# Patient Record
Sex: MALE | Race: WHITE | NOT HISPANIC OR LATINO | Employment: OTHER | URBAN - METROPOLITAN AREA
[De-identification: names, ages, dates, MRNs, and addresses within clinical notes are randomized per-mention and may not be internally consistent; named-entity substitution may affect disease eponyms.]

---

## 2023-04-05 ENCOUNTER — HOSPITAL ENCOUNTER (OUTPATIENT)
Dept: DATA CONVERSION | Facility: HOSPITAL | Age: 73
End: 2023-04-05
Attending: RADIOLOGY | Admitting: RADIOLOGY
Payer: MEDICARE

## 2023-04-05 DIAGNOSIS — R18.8 OTHER ASCITES: ICD-10-CM

## 2023-04-05 DIAGNOSIS — Z98.84 BARIATRIC SURGERY STATUS: ICD-10-CM

## 2023-04-13 PROBLEM — K21.9 GASTROESOPHAGEAL REFLUX DISEASE: Status: ACTIVE | Noted: 2023-04-13

## 2023-05-10 LAB
INR IN PPP BY COAGULATION ASSAY: 1 (ref 0.9–1.1)
PROTHROMBIN TIME (PT) IN PPP BY COAGULATION ASSAY: 11.9 SEC (ref 9.8–13.4)

## 2023-05-11 LAB
ALANINE AMINOTRANSFERASE (SGPT) (U/L) IN SER/PLAS: 10 U/L (ref 10–52)
ALBUMIN (G/DL) IN SER/PLAS: 3.7 G/DL (ref 3.4–5)
ALKALINE PHOSPHATASE (U/L) IN SER/PLAS: 73 U/L (ref 33–136)
ALPHA-1 FETOPROTEIN (NG/ML) IN SER/PLAS: <4 NG/ML (ref 0–9)
ANION GAP IN SER/PLAS: 11 MMOL/L (ref 10–20)
ASPARTATE AMINOTRANSFERASE (SGOT) (U/L) IN SER/PLAS: 18 U/L (ref 9–39)
BASOPHILS (10*3/UL) IN BLOOD BY AUTOMATED COUNT: 0.05 X10E9/L (ref 0–0.1)
BASOPHILS/100 LEUKOCYTES IN BLOOD BY AUTOMATED COUNT: 1 % (ref 0–2)
BILIRUBIN DIRECT (MG/DL) IN SER/PLAS: 0.2 MG/DL (ref 0–0.3)
BILIRUBIN TOTAL (MG/DL) IN SER/PLAS: 0.6 MG/DL (ref 0–1.2)
CALCIUM (MG/DL) IN SER/PLAS: 9.1 MG/DL (ref 8.6–10.6)
CARBON DIOXIDE, TOTAL (MMOL/L) IN SER/PLAS: 28 MMOL/L (ref 21–32)
CHLORIDE (MMOL/L) IN SER/PLAS: 104 MMOL/L (ref 98–107)
CREATININE (MG/DL) IN SER/PLAS: 0.75 MG/DL (ref 0.5–1.3)
EOSINOPHILS (10*3/UL) IN BLOOD BY AUTOMATED COUNT: 0.09 X10E9/L (ref 0–0.4)
EOSINOPHILS/100 LEUKOCYTES IN BLOOD BY AUTOMATED COUNT: 1.8 % (ref 0–6)
ERYTHROCYTE DISTRIBUTION WIDTH (RATIO) BY AUTOMATED COUNT: 13.8 % (ref 11.5–14.5)
ERYTHROCYTE MEAN CORPUSCULAR HEMOGLOBIN CONCENTRATION (G/DL) BY AUTOMATED: 31.4 G/DL (ref 32–36)
ERYTHROCYTE MEAN CORPUSCULAR VOLUME (FL) BY AUTOMATED COUNT: 98 FL (ref 80–100)
ERYTHROCYTES (10*6/UL) IN BLOOD BY AUTOMATED COUNT: 4.41 X10E12/L (ref 4.5–5.9)
GFR MALE: >90 ML/MIN/1.73M2
GLUCOSE (MG/DL) IN SER/PLAS: 77 MG/DL (ref 74–99)
HEMATOCRIT (%) IN BLOOD BY AUTOMATED COUNT: 43 % (ref 41–52)
HEMOGLOBIN (G/DL) IN BLOOD: 13.5 G/DL (ref 13.5–17.5)
IMMATURE GRANULOCYTES/100 LEUKOCYTES IN BLOOD BY AUTOMATED COUNT: 0.2 % (ref 0–0.9)
LEUKOCYTES (10*3/UL) IN BLOOD BY AUTOMATED COUNT: 5.1 X10E9/L (ref 4.4–11.3)
LYMPHOCYTES (10*3/UL) IN BLOOD BY AUTOMATED COUNT: 1.29 X10E9/L (ref 0.8–3)
LYMPHOCYTES/100 LEUKOCYTES IN BLOOD BY AUTOMATED COUNT: 25.1 % (ref 13–44)
MONOCYTES (10*3/UL) IN BLOOD BY AUTOMATED COUNT: 0.37 X10E9/L (ref 0.05–0.8)
MONOCYTES/100 LEUKOCYTES IN BLOOD BY AUTOMATED COUNT: 7.2 % (ref 2–10)
NEUTROPHILS (10*3/UL) IN BLOOD BY AUTOMATED COUNT: 3.33 X10E9/L (ref 1.6–5.5)
NEUTROPHILS/100 LEUKOCYTES IN BLOOD BY AUTOMATED COUNT: 64.7 % (ref 40–80)
NRBC (PER 100 WBCS) BY AUTOMATED COUNT: 0 /100 WBC (ref 0–0)
PLATELETS (10*3/UL) IN BLOOD AUTOMATED COUNT: 256 X10E9/L (ref 150–450)
POTASSIUM (MMOL/L) IN SER/PLAS: 4.1 MMOL/L (ref 3.5–5.3)
PROTEIN TOTAL: 6.8 G/DL (ref 6.4–8.2)
SODIUM (MMOL/L) IN SER/PLAS: 139 MMOL/L (ref 136–145)
UREA NITROGEN (MG/DL) IN SER/PLAS: 16 MG/DL (ref 6–23)

## 2023-06-12 PROBLEM — K44.9 HERNIA, HIATAL: Status: ACTIVE | Noted: 2023-06-12

## 2023-06-12 PROBLEM — K28.9 ULCER, ANASTOMOTIC: Status: ACTIVE | Noted: 2023-06-12

## 2023-06-12 PROBLEM — I10 HYPERTENSION, UNCONTROLLED: Status: ACTIVE | Noted: 2023-06-12

## 2023-06-12 PROBLEM — R22.2 NODULE OF SKIN OF CHEST: Status: ACTIVE | Noted: 2023-06-12

## 2023-06-12 PROBLEM — K56.609 SMALL BOWEL OBSTRUCTION (MULTI): Status: ACTIVE | Noted: 2023-06-12

## 2023-06-12 PROBLEM — G47.33 OBSTRUCTIVE SLEEP APNEA: Status: ACTIVE | Noted: 2023-06-12

## 2023-06-12 PROBLEM — L97.909 VENOUS STASIS ULCER (MULTI): Status: ACTIVE | Noted: 2023-06-12

## 2023-06-12 PROBLEM — R18.8 OTHER ASCITES: Status: ACTIVE | Noted: 2023-06-12

## 2023-06-12 PROBLEM — I27.20 PULMONARY HYPERTENSION (MULTI): Status: ACTIVE | Noted: 2023-06-12

## 2023-06-12 PROBLEM — E55.9 VITAMIN D DEFICIENCY: Status: ACTIVE | Noted: 2023-06-12

## 2023-06-12 PROBLEM — I10 BENIGN ESSENTIAL HYPERTENSION: Status: ACTIVE | Noted: 2023-06-12

## 2023-06-12 PROBLEM — K76.6 PORTAL HYPERTENSION (MULTI): Status: ACTIVE | Noted: 2023-06-12

## 2023-06-12 PROBLEM — I83.009 VENOUS STASIS ULCER (MULTI): Status: ACTIVE | Noted: 2023-06-12

## 2023-06-12 PROBLEM — I82.511 CHRONIC DEEP VEIN THROMBOSIS (DVT) OF FEMORAL VEIN OF RIGHT LOWER EXTREMITY (MULTI): Status: ACTIVE | Noted: 2023-06-12

## 2023-06-12 PROBLEM — K27.9 PUD (PEPTIC ULCER DISEASE): Status: ACTIVE | Noted: 2023-06-12

## 2023-06-12 PROBLEM — B99.9 INTRA-ABDOMINAL INFECTION: Status: ACTIVE | Noted: 2023-06-12

## 2023-06-12 PROBLEM — E78.2 ELEVATED TRIGLYCERIDES WITH HIGH CHOLESTEROL: Status: ACTIVE | Noted: 2023-06-12

## 2023-06-12 PROBLEM — Z98.84 HISTORY OF BARIATRIC SURGERY: Status: ACTIVE | Noted: 2023-06-12

## 2023-06-12 PROBLEM — K74.60 HEPATIC CIRRHOSIS (MULTI): Status: ACTIVE | Noted: 2023-06-12

## 2023-06-12 PROBLEM — E61.1 LOW SERUM IRON: Status: ACTIVE | Noted: 2023-06-12

## 2023-06-12 PROBLEM — E53.8 VITAMIN B12 DEFICIENCY: Status: ACTIVE | Noted: 2023-06-12

## 2023-06-12 PROBLEM — I95.1 ORTHOSTATIC HYPOTENSION: Status: ACTIVE | Noted: 2023-06-12

## 2023-06-12 PROBLEM — R20.9 SENSORY DISTURBANCE: Status: ACTIVE | Noted: 2023-06-12

## 2023-06-12 PROBLEM — N20.0 NEPHROLITHIASIS: Status: ACTIVE | Noted: 2023-06-12

## 2023-06-12 PROBLEM — Z95.828 S/P INSERTION OF INFERIOR VENA CAVAL FILTER: Status: ACTIVE | Noted: 2023-06-12

## 2023-06-12 PROBLEM — N52.9 INABILITY TO ATTAIN ERECTION: Status: ACTIVE | Noted: 2023-06-12

## 2023-06-12 PROBLEM — I89.0 LYMPHEDEMA: Status: ACTIVE | Noted: 2023-06-12

## 2023-06-12 PROBLEM — I49.5 TACHY-BRADY SYNDROME (MULTI): Status: ACTIVE | Noted: 2023-06-12

## 2023-06-12 PROBLEM — H60.543 ECZEMA OF BOTH EXTERNAL EARS: Status: ACTIVE | Noted: 2023-06-12

## 2023-06-12 PROBLEM — L57.0 ACTINIC KERATOSIS: Status: ACTIVE | Noted: 2023-06-12

## 2023-06-12 PROBLEM — E83.119 HEMOCHROMATOSIS: Status: ACTIVE | Noted: 2023-06-12

## 2023-06-12 PROBLEM — K92.2 GASTROINTESTINAL BLEED: Status: ACTIVE | Noted: 2023-06-12

## 2023-06-12 PROBLEM — R32 URINARY INCONTINENCE: Status: ACTIVE | Noted: 2023-06-12

## 2023-06-12 PROBLEM — B35.6 DERMATOPHYTOSIS OF GROIN AND PERIANAL AREA: Status: ACTIVE | Noted: 2023-06-12

## 2023-06-12 PROBLEM — G93.32 CHRONIC FATIGUE SYNDROME: Status: ACTIVE | Noted: 2023-06-12

## 2023-06-12 RX ORDER — BUPROPION HYDROCHLORIDE 75 MG/1
1 TABLET ORAL DAILY
COMMUNITY
Start: 2023-03-02 | End: 2023-09-13 | Stop reason: ALTCHOICE

## 2023-06-12 RX ORDER — SPIRONOLACTONE 25 MG/1
3 TABLET ORAL DAILY
COMMUNITY
Start: 2022-11-11 | End: 2023-10-03 | Stop reason: SDUPTHER

## 2023-06-12 RX ORDER — ACETAMINOPHEN, DIPHENHYDRAMINE HCL, PHENYLEPHRINE HCL 325; 25; 5 MG/1; MG/1; MG/1
TABLET ORAL
COMMUNITY
Start: 2021-02-24

## 2023-06-12 RX ORDER — MULTIVITAMIN
1 TABLET,CHEWABLE ORAL DAILY
COMMUNITY

## 2023-06-15 ENCOUNTER — OFFICE VISIT (OUTPATIENT)
Dept: PRIMARY CARE | Facility: CLINIC | Age: 73
End: 2023-06-15
Payer: MEDICARE

## 2023-06-15 VITALS
SYSTOLIC BLOOD PRESSURE: 142 MMHG | WEIGHT: 183.2 LBS | OXYGEN SATURATION: 100 % | DIASTOLIC BLOOD PRESSURE: 70 MMHG | HEART RATE: 57 BPM | TEMPERATURE: 96.2 F | BODY MASS INDEX: 28.69 KG/M2

## 2023-06-15 DIAGNOSIS — L03.114 CELLULITIS OF LEFT FOREARM: Primary | ICD-10-CM

## 2023-06-15 DIAGNOSIS — S83.92XA SPRAIN OF LEFT KNEE, UNSPECIFIED LIGAMENT, INITIAL ENCOUNTER: ICD-10-CM

## 2023-06-15 DIAGNOSIS — I49.5 TACHY-BRADY SYNDROME (MULTI): ICD-10-CM

## 2023-06-15 DIAGNOSIS — K74.60 CIRRHOSIS OF LIVER WITH ASCITES, UNSPECIFIED HEPATIC CIRRHOSIS TYPE (MULTI): ICD-10-CM

## 2023-06-15 DIAGNOSIS — I27.20 PULMONARY HYPERTENSION (MULTI): ICD-10-CM

## 2023-06-15 DIAGNOSIS — R18.8 CIRRHOSIS OF LIVER WITH ASCITES, UNSPECIFIED HEPATIC CIRRHOSIS TYPE (MULTI): ICD-10-CM

## 2023-06-15 DIAGNOSIS — I10 BENIGN ESSENTIAL HYPERTENSION: ICD-10-CM

## 2023-06-15 DIAGNOSIS — Z95.828 S/P INSERTION OF INFERIOR VENA CAVAL FILTER: ICD-10-CM

## 2023-06-15 DIAGNOSIS — K76.6 PORTAL HYPERTENSION (MULTI): ICD-10-CM

## 2023-06-15 DIAGNOSIS — R32 URINARY INCONTINENCE, UNSPECIFIED TYPE: ICD-10-CM

## 2023-06-15 PROBLEM — L57.0 ACTINIC KERATOSIS: Status: RESOLVED | Noted: 2023-06-12 | Resolved: 2023-06-15

## 2023-06-15 PROBLEM — L97.909 VENOUS STASIS ULCER (MULTI): Status: RESOLVED | Noted: 2023-06-12 | Resolved: 2023-06-15

## 2023-06-15 PROBLEM — G93.32 CHRONIC FATIGUE SYNDROME: Status: RESOLVED | Noted: 2023-06-12 | Resolved: 2023-06-15

## 2023-06-15 PROBLEM — I83.009 VENOUS STASIS ULCER (MULTI): Status: RESOLVED | Noted: 2023-06-12 | Resolved: 2023-06-15

## 2023-06-15 PROBLEM — K44.9 HERNIA, HIATAL: Status: RESOLVED | Noted: 2023-06-12 | Resolved: 2023-06-15

## 2023-06-15 PROCEDURE — 3077F SYST BP >= 140 MM HG: CPT | Performed by: INTERNAL MEDICINE

## 2023-06-15 PROCEDURE — 1036F TOBACCO NON-USER: CPT | Performed by: INTERNAL MEDICINE

## 2023-06-15 PROCEDURE — 99214 OFFICE O/P EST MOD 30 MIN: CPT | Performed by: INTERNAL MEDICINE

## 2023-06-15 PROCEDURE — 3078F DIAST BP <80 MM HG: CPT | Performed by: INTERNAL MEDICINE

## 2023-06-15 PROCEDURE — 1159F MED LIST DOCD IN RCRD: CPT | Performed by: INTERNAL MEDICINE

## 2023-06-15 PROCEDURE — 1160F RVW MEDS BY RX/DR IN RCRD: CPT | Performed by: INTERNAL MEDICINE

## 2023-06-15 ASSESSMENT — ENCOUNTER SYMPTOMS
WHEEZING: 0
ABDOMINAL PAIN: 0
FATIGUE: 0
ARTHRALGIAS: 0
BLOOD IN STOOL: 0
WOUND: 1
DIARRHEA: 0
BRUISES/BLEEDS EASILY: 0
FEVER: 0
DIZZINESS: 0
JOINT SWELLING: 1
PALPITATIONS: 0
SINUS PAIN: 0
DIFFICULTY URINATING: 0
HEADACHES: 0
SORE THROAT: 0
COUGH: 0
UNEXPECTED WEIGHT CHANGE: 0

## 2023-06-15 NOTE — PROGRESS NOTES
Subjective   Patient ID: William Philip is a 72 y.o. male who presents for Fall (Lt knee hyperextended, lt wrist wound x 1 week).    -Patient scratched his left forearm comes today for evaluation left forearm cellulitis with large scab local cellulitis  Patient given clean dressing in the office with triple antibiotic and nonstick dressing patient need to change her dressing every 72 hours until following up this scab follow-up if no improvement  - Patient fell over his dog in his backyard now having left knee pain physical exam left knee sprain patient counseled about ice packing knee support follow-up closely as needed  - Patient seen by hematology Dr.:Hubert reason for consult reviewed and scanned in the chart  Liver cirrhosis stage III, 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       Fall  Pertinent negatives include no abdominal pain, fever or headaches.          Review of Systems   Constitutional:  Negative for fatigue, fever and unexpected weight change.   HENT:  Negative for congestion, ear discharge, ear pain, mouth sores, sinus pain and sore throat.    Eyes:  Negative for visual disturbance.   Respiratory:  Negative for cough and wheezing.    Cardiovascular:  Negative for chest pain, palpitations and leg swelling.   Gastrointestinal:  Negative for abdominal pain, blood in stool and diarrhea.   Genitourinary:  Negative for difficulty  urinating.   Musculoskeletal:  Positive for joint swelling (Left knee swelling). Negative for arthralgias.   Skin:  Positive for wound (arm wound left forearm wound). Negative for rash.   Neurological:  Negative for dizziness and headaches.   Hematological:  Does not bruise/bleed easily.   Psychiatric/Behavioral:  Negative for behavioral problems.    All other systems reviewed and are negative.      Objective   Lab Results   Component Value Date    HGBA1C 4.6 05/14/2021      /70   Pulse 57   Temp 35.7 °C (96.2 °F)   Wt 83.1 kg (183 lb 3.2 oz)   SpO2 100%   BMI 28.69 kg/m²     Physical Exam  Vitals and nursing note reviewed.   Constitutional:       Appearance: Normal appearance. He is ill-appearing.   HENT:      Head: Normocephalic.      Nose: Nose normal.   Eyes:      Conjunctiva/sclera: Conjunctivae normal.      Pupils: Pupils are equal, round, and reactive to light.   Cardiovascular:      Rate and Rhythm: Regular rhythm.   Pulmonary:      Effort: Pulmonary effort is normal.      Breath sounds: Normal breath sounds.   Abdominal:      General: Abdomen is flat.      Palpations: Abdomen is soft.   Musculoskeletal:         General: Swelling and tenderness (Left knee swelling and tenderness) present.      Cervical back: Neck supple.   Skin:     General: Skin is warm.      Findings: Erythema (Left forearm cellulitis and scab) present.   Neurological:      General: No focal deficit present.      Mental Status: He is oriented to person, place, and time.   Psychiatric:         Mood and Affect: Mood normal.         Assessment/Plan   William was seen today for fall.  Diagnoses and all orders for this visit:  Cellulitis of left forearm (Primary)  Portal hypertension (CMS/HCC)  Pulmonary hypertension (CMS/HCC)  Tachy-hernan syndrome (CMS/HCC)  Sprain of left knee, unspecified ligament, initial encounter  Benign essential hypertension  Urinary incontinence, unspecified type  S/P insertion of inferior vena caval  filter  Cirrhosis of liver with ascites, unspecified hepatic cirrhosis type (CMS/HCC)   -Patient scratched his left forearm comes today for evaluation left forearm cellulitis with large scab local cellulitis  Patient given clean dressing in the office with triple antibiotic and nonstick dressing patient need to change her dressing every 72 hours until following up this scab follow-up if no improvement  - Patient fell over his dog in his backyard now having left knee pain physical exam left knee sprain patient counseled about ice packing knee support follow-up closely as needed  - Patient seen by hematology Dr.:Hubert reason for consult reviewed and scanned in the chart  Liver cirrhosis stage III, 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

## 2023-07-06 ENCOUNTER — HOSPITAL ENCOUNTER (OUTPATIENT)
Dept: DATA CONVERSION | Facility: HOSPITAL | Age: 73
End: 2023-07-06
Attending: RADIOLOGY | Admitting: RADIOLOGY
Payer: MEDICARE

## 2023-07-06 DIAGNOSIS — R18.8 OTHER ASCITES: ICD-10-CM

## 2023-07-10 LAB
ALANINE AMINOTRANSFERASE (SGPT) (U/L) IN SER/PLAS: 11 U/L (ref 10–52)
ALBUMIN (G/DL) IN SER/PLAS: 3.7 G/DL (ref 3.4–5)
ALKALINE PHOSPHATASE (U/L) IN SER/PLAS: 77 U/L (ref 33–136)
ANION GAP IN SER/PLAS: 11 MMOL/L (ref 10–20)
ASPARTATE AMINOTRANSFERASE (SGOT) (U/L) IN SER/PLAS: 18 U/L (ref 9–39)
BASOPHILS (10*3/UL) IN BLOOD BY AUTOMATED COUNT: 0.08 X10E9/L (ref 0–0.1)
BASOPHILS/100 LEUKOCYTES IN BLOOD BY AUTOMATED COUNT: 1.5 % (ref 0–2)
BILIRUBIN DIRECT (MG/DL) IN SER/PLAS: 0.1 MG/DL (ref 0–0.3)
BILIRUBIN TOTAL (MG/DL) IN SER/PLAS: 0.4 MG/DL (ref 0–1.2)
CALCIUM (MG/DL) IN SER/PLAS: 9 MG/DL (ref 8.6–10.3)
CARBON DIOXIDE, TOTAL (MMOL/L) IN SER/PLAS: 28 MMOL/L (ref 21–32)
CHLORIDE (MMOL/L) IN SER/PLAS: 103 MMOL/L (ref 98–107)
CREATININE (MG/DL) IN SER/PLAS: 0.84 MG/DL (ref 0.5–1.3)
EOSINOPHILS (10*3/UL) IN BLOOD BY AUTOMATED COUNT: 0.11 X10E9/L (ref 0–0.4)
EOSINOPHILS/100 LEUKOCYTES IN BLOOD BY AUTOMATED COUNT: 2 % (ref 0–6)
ERYTHROCYTE DISTRIBUTION WIDTH (RATIO) BY AUTOMATED COUNT: 13.2 % (ref 11.5–14.5)
ERYTHROCYTE MEAN CORPUSCULAR HEMOGLOBIN CONCENTRATION (G/DL) BY AUTOMATED: 30.4 G/DL (ref 32–36)
ERYTHROCYTE MEAN CORPUSCULAR VOLUME (FL) BY AUTOMATED COUNT: 103 FL (ref 80–100)
ERYTHROCYTES (10*6/UL) IN BLOOD BY AUTOMATED COUNT: 4.61 X10E12/L (ref 4.5–5.9)
GFR MALE: >90 ML/MIN/1.73M2
GLUCOSE (MG/DL) IN SER/PLAS: 59 MG/DL (ref 74–99)
HEMATOCRIT (%) IN BLOOD BY AUTOMATED COUNT: 47.4 % (ref 41–52)
HEMOGLOBIN (G/DL) IN BLOOD: 14.4 G/DL (ref 13.5–17.5)
IMMATURE GRANULOCYTES/100 LEUKOCYTES IN BLOOD BY AUTOMATED COUNT: 0.4 % (ref 0–0.9)
INR IN PPP BY COAGULATION ASSAY: 1 (ref 0.9–1.1)
LEUKOCYTES (10*3/UL) IN BLOOD BY AUTOMATED COUNT: 5.4 X10E9/L (ref 4.4–11.3)
LYMPHOCYTES (10*3/UL) IN BLOOD BY AUTOMATED COUNT: 1.69 X10E9/L (ref 0.8–3)
LYMPHOCYTES/100 LEUKOCYTES IN BLOOD BY AUTOMATED COUNT: 31.1 % (ref 13–44)
MONOCYTES (10*3/UL) IN BLOOD BY AUTOMATED COUNT: 0.44 X10E9/L (ref 0.05–0.8)
MONOCYTES/100 LEUKOCYTES IN BLOOD BY AUTOMATED COUNT: 8.1 % (ref 2–10)
NEUTROPHILS (10*3/UL) IN BLOOD BY AUTOMATED COUNT: 3.1 X10E9/L (ref 1.6–5.5)
NEUTROPHILS/100 LEUKOCYTES IN BLOOD BY AUTOMATED COUNT: 56.9 % (ref 40–80)
PLATELETS (10*3/UL) IN BLOOD AUTOMATED COUNT: 253 X10E9/L (ref 150–450)
POTASSIUM (MMOL/L) IN SER/PLAS: 4.1 MMOL/L (ref 3.5–5.3)
PROTEIN TOTAL: 7.3 G/DL (ref 6.4–8.2)
PROTHROMBIN TIME (PT) IN PPP BY COAGULATION ASSAY: 11.4 SEC (ref 9.8–12.8)
SODIUM (MMOL/L) IN SER/PLAS: 138 MMOL/L (ref 136–145)
UREA NITROGEN (MG/DL) IN SER/PLAS: 16 MG/DL (ref 6–23)

## 2023-08-23 ENCOUNTER — HOSPITAL ENCOUNTER (OUTPATIENT)
Dept: DATA CONVERSION | Facility: HOSPITAL | Age: 73
End: 2023-08-23
Attending: RADIOLOGY | Admitting: RADIOLOGY
Payer: MEDICARE

## 2023-08-23 DIAGNOSIS — R18.8 OTHER ASCITES: ICD-10-CM

## 2023-09-11 LAB
ALANINE AMINOTRANSFERASE (SGPT) (U/L) IN SER/PLAS: 11 U/L (ref 10–52)
ALBUMIN (G/DL) IN SER/PLAS: 3.2 G/DL (ref 3.4–5)
ALKALINE PHOSPHATASE (U/L) IN SER/PLAS: 65 U/L (ref 33–136)
ANION GAP IN SER/PLAS: 12 MMOL/L (ref 10–20)
ASPARTATE AMINOTRANSFERASE (SGOT) (U/L) IN SER/PLAS: 17 U/L (ref 9–39)
BASOPHILS (10*3/UL) IN BLOOD BY AUTOMATED COUNT: 0.06 X10E9/L (ref 0–0.1)
BASOPHILS/100 LEUKOCYTES IN BLOOD BY AUTOMATED COUNT: 0.9 % (ref 0–2)
BILIRUBIN DIRECT (MG/DL) IN SER/PLAS: 0.1 MG/DL (ref 0–0.3)
BILIRUBIN TOTAL (MG/DL) IN SER/PLAS: 0.4 MG/DL (ref 0–1.2)
CALCIUM (MG/DL) IN SER/PLAS: 8.3 MG/DL (ref 8.6–10.3)
CARBON DIOXIDE, TOTAL (MMOL/L) IN SER/PLAS: 25 MMOL/L (ref 21–32)
CHLORIDE (MMOL/L) IN SER/PLAS: 106 MMOL/L (ref 98–107)
CREATININE (MG/DL) IN SER/PLAS: 0.89 MG/DL (ref 0.5–1.3)
EOSINOPHILS (10*3/UL) IN BLOOD BY AUTOMATED COUNT: 0.12 X10E9/L (ref 0–0.4)
EOSINOPHILS/100 LEUKOCYTES IN BLOOD BY AUTOMATED COUNT: 1.9 % (ref 0–6)
ERYTHROCYTE DISTRIBUTION WIDTH (RATIO) BY AUTOMATED COUNT: 13.6 % (ref 11.5–14.5)
ERYTHROCYTE MEAN CORPUSCULAR HEMOGLOBIN CONCENTRATION (G/DL) BY AUTOMATED: 30.7 G/DL (ref 32–36)
ERYTHROCYTE MEAN CORPUSCULAR VOLUME (FL) BY AUTOMATED COUNT: 101 FL (ref 80–100)
ERYTHROCYTES (10*6/UL) IN BLOOD BY AUTOMATED COUNT: 4.22 X10E12/L (ref 4.5–5.9)
GFR MALE: 90 ML/MIN/1.73M2
GLUCOSE (MG/DL) IN SER/PLAS: 78 MG/DL (ref 74–99)
HEMATOCRIT (%) IN BLOOD BY AUTOMATED COUNT: 42.7 % (ref 41–52)
HEMOGLOBIN (G/DL) IN BLOOD: 13.1 G/DL (ref 13.5–17.5)
IMMATURE GRANULOCYTES/100 LEUKOCYTES IN BLOOD BY AUTOMATED COUNT: 0.2 % (ref 0–0.9)
INR IN PPP BY COAGULATION ASSAY: 1.1 (ref 0.9–1.1)
LEUKOCYTES (10*3/UL) IN BLOOD BY AUTOMATED COUNT: 6.5 X10E9/L (ref 4.4–11.3)
LYMPHOCYTES (10*3/UL) IN BLOOD BY AUTOMATED COUNT: 1.41 X10E9/L (ref 0.8–3)
LYMPHOCYTES/100 LEUKOCYTES IN BLOOD BY AUTOMATED COUNT: 21.9 % (ref 13–44)
MONOCYTES (10*3/UL) IN BLOOD BY AUTOMATED COUNT: 0.42 X10E9/L (ref 0.05–0.8)
MONOCYTES/100 LEUKOCYTES IN BLOOD BY AUTOMATED COUNT: 6.5 % (ref 2–10)
NEUTROPHILS (10*3/UL) IN BLOOD BY AUTOMATED COUNT: 4.43 X10E9/L (ref 1.6–5.5)
NEUTROPHILS/100 LEUKOCYTES IN BLOOD BY AUTOMATED COUNT: 68.6 % (ref 40–80)
PLATELETS (10*3/UL) IN BLOOD AUTOMATED COUNT: 218 X10E9/L (ref 150–450)
POTASSIUM (MMOL/L) IN SER/PLAS: 4.6 MMOL/L (ref 3.5–5.3)
PROTEIN TOTAL: 6.2 G/DL (ref 6.4–8.2)
PROTHROMBIN TIME (PT) IN PPP BY COAGULATION ASSAY: 12 SEC (ref 9.8–12.8)
SODIUM (MMOL/L) IN SER/PLAS: 138 MMOL/L (ref 136–145)
UREA NITROGEN (MG/DL) IN SER/PLAS: 14 MG/DL (ref 6–23)

## 2023-09-13 ENCOUNTER — OFFICE VISIT (OUTPATIENT)
Dept: PRIMARY CARE | Facility: CLINIC | Age: 73
End: 2023-09-13
Payer: MEDICARE

## 2023-09-13 VITALS
BODY MASS INDEX: 30.28 KG/M2 | DIASTOLIC BLOOD PRESSURE: 72 MMHG | TEMPERATURE: 96.5 F | SYSTOLIC BLOOD PRESSURE: 140 MMHG | WEIGHT: 188.4 LBS | OXYGEN SATURATION: 100 % | HEART RATE: 49 BPM | HEIGHT: 66 IN

## 2023-09-13 DIAGNOSIS — I82.511 CHRONIC DEEP VEIN THROMBOSIS (DVT) OF FEMORAL VEIN OF RIGHT LOWER EXTREMITY (MULTI): ICD-10-CM

## 2023-09-13 DIAGNOSIS — R18.8 CIRRHOSIS OF LIVER WITH ASCITES, UNSPECIFIED HEPATIC CIRRHOSIS TYPE (MULTI): ICD-10-CM

## 2023-09-13 DIAGNOSIS — I10 BENIGN ESSENTIAL HYPERTENSION: ICD-10-CM

## 2023-09-13 DIAGNOSIS — K74.60 CIRRHOSIS OF LIVER WITH ASCITES, UNSPECIFIED HEPATIC CIRRHOSIS TYPE (MULTI): ICD-10-CM

## 2023-09-13 DIAGNOSIS — Z95.828 S/P INSERTION OF INFERIOR VENA CAVAL FILTER: ICD-10-CM

## 2023-09-13 DIAGNOSIS — K76.6 PORTAL HYPERTENSION (MULTI): ICD-10-CM

## 2023-09-13 DIAGNOSIS — R53.83 OTHER FATIGUE: ICD-10-CM

## 2023-09-13 DIAGNOSIS — Z23 FLU VACCINE NEED: ICD-10-CM

## 2023-09-13 DIAGNOSIS — I49.5 TACHY-BRADY SYNDROME (MULTI): Primary | ICD-10-CM

## 2023-09-13 DIAGNOSIS — Z00.00 ROUTINE GENERAL MEDICAL EXAMINATION AT HEALTH CARE FACILITY: ICD-10-CM

## 2023-09-13 DIAGNOSIS — Z13.89 SCREENING FOR MULTIPLE CONDITIONS: ICD-10-CM

## 2023-09-13 DIAGNOSIS — Z12.5 SCREENING FOR PROSTATE CANCER: ICD-10-CM

## 2023-09-13 DIAGNOSIS — K21.9 GASTROESOPHAGEAL REFLUX DISEASE, UNSPECIFIED WHETHER ESOPHAGITIS PRESENT: ICD-10-CM

## 2023-09-13 DIAGNOSIS — Z43.4: ICD-10-CM

## 2023-09-13 DIAGNOSIS — I27.20 PULMONARY HYPERTENSION (MULTI): ICD-10-CM

## 2023-09-13 PROCEDURE — G0008 ADMIN INFLUENZA VIRUS VAC: HCPCS | Performed by: INTERNAL MEDICINE

## 2023-09-13 PROCEDURE — 1036F TOBACCO NON-USER: CPT | Performed by: INTERNAL MEDICINE

## 2023-09-13 PROCEDURE — 90662 IIV NO PRSV INCREASED AG IM: CPT | Performed by: INTERNAL MEDICINE

## 2023-09-13 PROCEDURE — 1159F MED LIST DOCD IN RCRD: CPT | Performed by: INTERNAL MEDICINE

## 2023-09-13 PROCEDURE — 99214 OFFICE O/P EST MOD 30 MIN: CPT | Performed by: INTERNAL MEDICINE

## 2023-09-13 PROCEDURE — 3078F DIAST BP <80 MM HG: CPT | Performed by: INTERNAL MEDICINE

## 2023-09-13 PROCEDURE — G0444 DEPRESSION SCREEN ANNUAL: HCPCS | Performed by: INTERNAL MEDICINE

## 2023-09-13 PROCEDURE — 1126F AMNT PAIN NOTED NONE PRSNT: CPT | Performed by: INTERNAL MEDICINE

## 2023-09-13 PROCEDURE — 1170F FXNL STATUS ASSESSED: CPT | Performed by: INTERNAL MEDICINE

## 2023-09-13 PROCEDURE — 1160F RVW MEDS BY RX/DR IN RCRD: CPT | Performed by: INTERNAL MEDICINE

## 2023-09-13 PROCEDURE — 3077F SYST BP >= 140 MM HG: CPT | Performed by: INTERNAL MEDICINE

## 2023-09-13 RX ORDER — PANTOPRAZOLE SODIUM 40 MG/1
TABLET, DELAYED RELEASE ORAL
Qty: 180 TABLET | Refills: 0 | Status: SHIPPED | OUTPATIENT
Start: 2023-09-13 | End: 2023-12-13 | Stop reason: SDUPTHER

## 2023-09-13 ASSESSMENT — PATIENT HEALTH QUESTIONNAIRE - PHQ9
2. FEELING DOWN, DEPRESSED OR HOPELESS: SEVERAL DAYS
SUM OF ALL RESPONSES TO PHQ9 QUESTIONS 1 AND 2: 2
1. LITTLE INTEREST OR PLEASURE IN DOING THINGS: SEVERAL DAYS
10. IF YOU CHECKED OFF ANY PROBLEMS, HOW DIFFICULT HAVE THESE PROBLEMS MADE IT FOR YOU TO DO YOUR WORK, TAKE CARE OF THINGS AT HOME, OR GET ALONG WITH OTHER PEOPLE: NOT DIFFICULT AT ALL

## 2023-09-13 ASSESSMENT — ACTIVITIES OF DAILY LIVING (ADL)
DOING_HOUSEWORK: INDEPENDENT
GROCERY_SHOPPING: INDEPENDENT
DRESSING: INDEPENDENT
BATHING: INDEPENDENT
TAKING_MEDICATION: INDEPENDENT
MANAGING_FINANCES: INDEPENDENT

## 2023-09-13 ASSESSMENT — ENCOUNTER SYMPTOMS
FATIGUE: 0
DEPRESSION: 1
HEADACHES: 0
FEVER: 0
SORE THROAT: 0
BRUISES/BLEEDS EASILY: 0
COUGH: 0
HYPERTENSION: 1
WHEEZING: 0
ARTHRALGIAS: 0
BLOOD IN STOOL: 0
ABDOMINAL PAIN: 0
DIFFICULTY URINATING: 0
UNEXPECTED WEIGHT CHANGE: 0
SINUS PAIN: 0
PALPITATIONS: 0
DIARRHEA: 0
OCCASIONAL FEELINGS OF UNSTEADINESS: 0
DIZZINESS: 0
LOSS OF SENSATION IN FEET: 1

## 2023-09-13 NOTE — PROGRESS NOTES
Subjective   Reason for Visit: William Philip is an 73 y.o. male here for a Medicare Wellness visit.     Past Medical, Surgical, and Family History reviewed and updated in chart.    Reviewed all medications by prescribing practitioner or clinical pharmacist (such as prescriptions, OTCs, herbal therapies and supplements) and documented in the medical record.    Annual Medicare physical  - Vaccinations patient given high-dose flu vaccine today  Need to proceed with RSV vaccine and booster for COVID-vaccine  -Screening colonoscopy obtained  -Screening for depression  I spent 15 minutes obtaining and discussing depression screening using PHQ-2 questions with results documented in the chart.  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   GERD  He reports no abdominal pain, no chest pain, no coughing, no sore throat or no wheezing. Pertinent negatives include no fatigue.   Hyperlipidemia  Pertinent negatives include no chest pain.       Patient Care Team:  Xiomara Khan MD as PCP - General  Xiomara Khan MD as PCP [facility]     Review of Systems   Constitutional:  Negative for fatigue, fever and unexpected weight change.   HENT:  Negative for congestion,  "ear discharge, ear pain, mouth sores, sinus pain and sore throat.    Eyes:  Negative for visual disturbance.   Respiratory:  Negative for cough and wheezing.    Cardiovascular:  Negative for chest pain, palpitations and leg swelling.   Gastrointestinal:  Negative for abdominal pain, blood in stool and diarrhea.   Genitourinary:  Negative for difficulty urinating.   Musculoskeletal:  Negative for arthralgias.   Skin:  Negative for rash.   Neurological:  Negative for dizziness and headaches.   Hematological:  Does not bruise/bleed easily.   Psychiatric/Behavioral:  Negative for behavioral problems.    All other systems reviewed and are negative.      Objective   Vitals:  /72   Pulse (!) 49   Temp 35.8 °C (96.5 °F)   Ht 1.676 m (5' 6\")   Wt 85.5 kg (188 lb 6.4 oz)   SpO2 100%   BMI 30.41 kg/m²     Lab Results   Component Value Date    WBC 6.5 09/11/2023    HGB 13.1 (L) 09/11/2023    HCT 42.7 09/11/2023     09/11/2023    CHOL 93 01/06/2020    TRIG 62 01/06/2020    HDL 40.0 01/06/2020    ALT 11 09/11/2023    AST 17 09/11/2023     09/11/2023    K 4.6 09/11/2023     09/11/2023    CREATININE 0.89 09/11/2023    BUN 14 09/11/2023    CO2 25 09/11/2023    TSH 3.57 09/09/2022    PSA 1.20 05/02/2022    INR 1.1 09/11/2023    HGBA1C 4.6 05/14/2021     par   Physical Exam  Vitals and nursing note reviewed.   Constitutional:       Appearance: Normal appearance.   HENT:      Head: Normocephalic.      Nose: Nose normal.   Eyes:      Conjunctiva/sclera: Conjunctivae normal.      Pupils: Pupils are equal, round, and reactive to light.   Cardiovascular:      Rate and Rhythm: Regular rhythm.   Pulmonary:      Effort: Pulmonary effort is normal.      Breath sounds: Normal breath sounds.   Abdominal:      General: Abdomen is flat. There is distension.      Palpations: Abdomen is soft.   Musculoskeletal:      Cervical back: Neck supple.   Skin:     General: Skin is warm.   Neurological:      General: No focal " deficit present.      Mental Status: He is oriented to person, place, and time.   Psychiatric:         Mood and Affect: Mood normal.         Assessment/Plan   Problem List Items Addressed This Visit       Gastroesophageal reflux disease    Relevant Medications    pantoprazole (ProtoNix) 40 mg EC tablet    Benign essential hypertension    Chronic deep vein thrombosis (DVT) of femoral vein of right lower extremity (CMS/HCC)    Hepatic cirrhosis (CMS/HCC)    Portal hypertension (CMS/HCC)    Pulmonary hypertension (CMS/HCC)    S/P insertion of inferior vena caval filter    Tachy-hernan syndrome (CMS/HCC) - Primary    Encounter for attention to other artificial openings of digestive tract (CMS/HCC)     Other Visit Diagnoses       Flu vaccine need        Relevant Orders    Flu vaccine, quadrivalent, high-dose, preservative free, age 65y+ (FLUZONE) (Completed)    Screening for multiple conditions        Routine general medical examination at health care facility        Other fatigue        Relevant Orders    TSH with reflex to Free T4 if abnormal    Screening for prostate cancer        Relevant Orders    Prostate Specific Antigen, Screen          Annual Medicare physical  - Vaccinations patient given high-dose flu vaccine today  Need to proceed with RSV vaccine and booster for COVID-vaccine  -Screening colonoscopy obtained  -Screening for depression  I spent 15 minutes obtaining and discussing depression screening using PHQ-2 questions with results documented in the chart.  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

## 2023-10-03 ENCOUNTER — ANCILLARY PROCEDURE (OUTPATIENT)
Dept: RADIOLOGY | Facility: HOSPITAL | Age: 73
End: 2023-10-03
Payer: MEDICARE

## 2023-10-03 ENCOUNTER — TELEPHONE (OUTPATIENT)
Dept: GASTROENTEROLOGY | Facility: CLINIC | Age: 73
End: 2023-10-03
Payer: MEDICARE

## 2023-10-03 VITALS
OXYGEN SATURATION: 100 % | SYSTOLIC BLOOD PRESSURE: 133 MMHG | DIASTOLIC BLOOD PRESSURE: 55 MMHG | HEART RATE: 58 BPM | RESPIRATION RATE: 18 BRPM

## 2023-10-03 DIAGNOSIS — R18.8 OTHER ASCITES: Chronic | ICD-10-CM

## 2023-10-03 DIAGNOSIS — R18.8 OTHER ASCITES: Primary | ICD-10-CM

## 2023-10-03 PROCEDURE — 49083 ABD PARACENTESIS W/IMAGING: CPT | Performed by: RADIOLOGY

## 2023-10-03 PROCEDURE — 2720000007 HC OR 272 NO HCPCS

## 2023-10-03 PROCEDURE — 49083 ABD PARACENTESIS W/IMAGING: CPT

## 2023-10-03 PROCEDURE — C1729 CATH, DRAINAGE: HCPCS

## 2023-10-03 RX ORDER — SPIRONOLACTONE 25 MG/1
75 TABLET ORAL DAILY
Qty: 90 TABLET | Refills: 11 | Status: SHIPPED | OUTPATIENT
Start: 2023-10-03 | End: 2023-11-20 | Stop reason: SDUPTHER

## 2023-10-03 NOTE — DISCHARGE INSTRUCTIONS
Follow up with Primary Care Physician  [address]  May resume diet  May remove dressing 10/4/23  Report any signs of infection and bleeding

## 2023-10-16 ENCOUNTER — LAB (OUTPATIENT)
Dept: LAB | Facility: LAB | Age: 73
End: 2023-10-16
Payer: MEDICARE

## 2023-10-16 DIAGNOSIS — Z12.5 SCREENING FOR PROSTATE CANCER: ICD-10-CM

## 2023-10-16 DIAGNOSIS — R53.83 OTHER FATIGUE: ICD-10-CM

## 2023-10-16 LAB
PSA SERPL-MCNC: 0.91 NG/ML
TSH SERPL-ACNC: 3.63 MIU/L (ref 0.44–3.98)

## 2023-10-16 PROCEDURE — G0103 PSA SCREENING: HCPCS

## 2023-10-16 PROCEDURE — 36415 COLL VENOUS BLD VENIPUNCTURE: CPT

## 2023-11-08 ENCOUNTER — OFFICE VISIT (OUTPATIENT)
Dept: PULMONOLOGY | Facility: CLINIC | Age: 73
End: 2023-11-08
Payer: MEDICARE

## 2023-11-08 VITALS
OXYGEN SATURATION: 98 % | WEIGHT: 186.2 LBS | SYSTOLIC BLOOD PRESSURE: 137 MMHG | BODY MASS INDEX: 30.05 KG/M2 | DIASTOLIC BLOOD PRESSURE: 77 MMHG | HEART RATE: 59 BPM

## 2023-11-08 DIAGNOSIS — G47.33 OBSTRUCTIVE SLEEP APNEA: Primary | ICD-10-CM

## 2023-11-08 DIAGNOSIS — R18.8 OTHER ASCITES: ICD-10-CM

## 2023-11-08 PROCEDURE — 1126F AMNT PAIN NOTED NONE PRSNT: CPT | Performed by: NURSE PRACTITIONER

## 2023-11-08 PROCEDURE — 3078F DIAST BP <80 MM HG: CPT | Performed by: NURSE PRACTITIONER

## 2023-11-08 PROCEDURE — 1160F RVW MEDS BY RX/DR IN RCRD: CPT | Performed by: NURSE PRACTITIONER

## 2023-11-08 PROCEDURE — 94660 CPAP INITIATION&MGMT: CPT | Performed by: NURSE PRACTITIONER

## 2023-11-08 PROCEDURE — 1036F TOBACCO NON-USER: CPT | Performed by: NURSE PRACTITIONER

## 2023-11-08 PROCEDURE — 3075F SYST BP GE 130 - 139MM HG: CPT | Performed by: NURSE PRACTITIONER

## 2023-11-08 PROCEDURE — 1159F MED LIST DOCD IN RCRD: CPT | Performed by: NURSE PRACTITIONER

## 2023-11-08 NOTE — PATIENT INSTRUCTIONS
Mr. Philip it was a pleasure seeing you in the office today. We discussed the following:      - Good use of your CPAP. Continue to use your CPAP every night with sleep   - Congratulations on your weight loss after bariatric surgery    Follow-up in 1 year with your CPAP machine or sooner if needed

## 2023-11-13 ENCOUNTER — ANCILLARY PROCEDURE (OUTPATIENT)
Dept: RADIOLOGY | Facility: HOSPITAL | Age: 73
End: 2023-11-13
Payer: MEDICARE

## 2023-11-13 VITALS
RESPIRATION RATE: 14 BRPM | DIASTOLIC BLOOD PRESSURE: 60 MMHG | OXYGEN SATURATION: 100 % | HEART RATE: 59 BPM | SYSTOLIC BLOOD PRESSURE: 115 MMHG

## 2023-11-13 DIAGNOSIS — R18.8 OTHER ASCITES: ICD-10-CM

## 2023-11-13 PROCEDURE — 49083 ABD PARACENTESIS W/IMAGING: CPT | Performed by: RADIOLOGY

## 2023-11-13 PROCEDURE — 49083 ABD PARACENTESIS W/IMAGING: CPT

## 2023-11-13 PROCEDURE — 2720000007 HC OR 272 NO HCPCS

## 2023-11-13 PROCEDURE — P9047 ALBUMIN (HUMAN), 25%, 50ML: HCPCS | Mod: JZ | Performed by: RADIOLOGY

## 2023-11-13 PROCEDURE — 2500000004 HC RX 250 GENERAL PHARMACY W/ HCPCS (ALT 636 FOR OP/ED): Mod: JZ | Performed by: RADIOLOGY

## 2023-11-13 RX ORDER — ALBUMIN HUMAN 250 G/1000ML
SOLUTION INTRAVENOUS CONTINUOUS PRN
Status: COMPLETED | OUTPATIENT
Start: 2023-11-13 | End: 2023-11-13

## 2023-11-13 RX ORDER — ALBUMIN HUMAN 250 G/1000ML
SOLUTION INTRAVENOUS
Status: DISPENSED
Start: 2023-11-13 | End: 2023-11-14

## 2023-11-13 RX ADMIN — ALBUMIN HUMAN 25 G: 0.25 SOLUTION INTRAVENOUS at 14:25

## 2023-11-13 ASSESSMENT — PAIN - FUNCTIONAL ASSESSMENT: PAIN_FUNCTIONAL_ASSESSMENT: 0-10

## 2023-11-13 ASSESSMENT — PAIN SCALES - GENERAL: PAINLEVEL_OUTOF10: 0 - NO PAIN

## 2023-11-13 NOTE — POST-PROCEDURE NOTE
Post Procedure Note:    Procedure Data and Time: [unfilled] at [unfilled]     Pre-Procedure Time Out Performed    Post-Procedure [place] Performed    Pre-Procedure Diagnosis:  ascites    Post-Procedure Diagnosis:  ascites    Procedure:  ultrasound guided paracentesis    : Stephen Isaac MD    Assistant: None    Blood Loss: Minimal    Specimen: None    Attestation: I performed the procedure         pt to go back to [facility] home soc work arranging

## 2023-11-13 NOTE — DISCHARGE INSTRUCTIONS
Take it easy today.  No heavy lifting. Nothing heavier than a gallon of milk.  May drive.  Remove dressing tomorrow and you may shower tomorrow.  May take tylenol as needed for pain.  Follow up with Dr. Gaspar  In 10 days

## 2023-11-13 NOTE — PRE-PROCEDURE NOTE
Pre-procedure Note:    Allergies and medications reviewed.    ASA Classification: ASA 2 - Patient with mild systemic disease with no functional limitations    Sedation: Minimal

## 2023-11-14 ENCOUNTER — LAB (OUTPATIENT)
Dept: LAB | Facility: LAB | Age: 73
End: 2023-11-14
Payer: MEDICARE

## 2023-11-14 ENCOUNTER — OFFICE VISIT (OUTPATIENT)
Dept: GASTROENTEROLOGY | Facility: CLINIC | Age: 73
End: 2023-11-14
Payer: MEDICARE

## 2023-11-14 VITALS
DIASTOLIC BLOOD PRESSURE: 78 MMHG | HEIGHT: 67 IN | BODY MASS INDEX: 28.06 KG/M2 | SYSTOLIC BLOOD PRESSURE: 138 MMHG | OXYGEN SATURATION: 95 % | HEART RATE: 53 BPM | WEIGHT: 178.8 LBS

## 2023-11-14 DIAGNOSIS — R18.8 OTHER ASCITES: Primary | ICD-10-CM

## 2023-11-14 DIAGNOSIS — K74.60 CIRRHOSIS OF LIVER WITH ASCITES, UNSPECIFIED HEPATIC CIRRHOSIS TYPE (MULTI): ICD-10-CM

## 2023-11-14 DIAGNOSIS — R18.8 CIRRHOSIS OF LIVER WITH ASCITES, UNSPECIFIED HEPATIC CIRRHOSIS TYPE (MULTI): ICD-10-CM

## 2023-11-14 DIAGNOSIS — E83.110 HEREDITARY HEMOCHROMATOSIS (CMS-HCC): ICD-10-CM

## 2023-11-14 DIAGNOSIS — K76.6 PORTAL HYPERTENSION (MULTI): ICD-10-CM

## 2023-11-14 LAB
AFP SERPL-MCNC: <4 NG/ML (ref 0–9)
ALBUMIN SERPL BCP-MCNC: 3.7 G/DL (ref 3.4–5)
ALP SERPL-CCNC: 69 U/L (ref 33–136)
ALT SERPL W P-5'-P-CCNC: 10 U/L (ref 10–52)
ANION GAP SERPL CALC-SCNC: 14 MMOL/L (ref 10–20)
AST SERPL W P-5'-P-CCNC: 19 U/L (ref 9–39)
BASOPHILS # BLD AUTO: 0.05 X10*3/UL (ref 0–0.1)
BASOPHILS NFR BLD AUTO: 0.9 %
BILIRUB DIRECT SERPL-MCNC: 0.1 MG/DL (ref 0–0.3)
BILIRUB SERPL-MCNC: 0.7 MG/DL (ref 0–1.2)
BUN SERPL-MCNC: 12 MG/DL (ref 6–23)
CALCIUM SERPL-MCNC: 9.2 MG/DL (ref 8.6–10.6)
CHLORIDE SERPL-SCNC: 106 MMOL/L (ref 98–107)
CO2 SERPL-SCNC: 26 MMOL/L (ref 21–32)
CREAT SERPL-MCNC: 0.84 MG/DL (ref 0.5–1.3)
EOSINOPHIL # BLD AUTO: 0.06 X10*3/UL (ref 0–0.4)
EOSINOPHIL NFR BLD AUTO: 1.1 %
ERYTHROCYTE [DISTWIDTH] IN BLOOD BY AUTOMATED COUNT: 13.2 % (ref 11.5–14.5)
FERRITIN SERPL-MCNC: 338 NG/ML (ref 20–300)
GFR SERPL CREATININE-BSD FRML MDRD: >90 ML/MIN/1.73M*2
GLUCOSE SERPL-MCNC: 67 MG/DL (ref 74–99)
HBV SURFACE AB SER-ACNC: <3.1 MIU/ML
HCT VFR BLD AUTO: 45.4 % (ref 41–52)
HGB BLD-MCNC: 14 G/DL (ref 13.5–17.5)
IMM GRANULOCYTES # BLD AUTO: 0.01 X10*3/UL (ref 0–0.5)
IMM GRANULOCYTES NFR BLD AUTO: 0.2 % (ref 0–0.9)
INR PPP: 0.8 (ref 0.9–1.1)
LYMPHOCYTES # BLD AUTO: 1.3 X10*3/UL (ref 0.8–3)
LYMPHOCYTES NFR BLD AUTO: 24.1 %
MCH RBC QN AUTO: 30.6 PG (ref 26–34)
MCHC RBC AUTO-ENTMCNC: 30.8 G/DL (ref 32–36)
MCV RBC AUTO: 99 FL (ref 80–100)
MONOCYTES # BLD AUTO: 0.36 X10*3/UL (ref 0.05–0.8)
MONOCYTES NFR BLD AUTO: 6.7 %
NEUTROPHILS # BLD AUTO: 3.62 X10*3/UL (ref 1.6–5.5)
NEUTROPHILS NFR BLD AUTO: 67 %
NRBC BLD-RTO: 0 /100 WBCS (ref 0–0)
PLATELET # BLD AUTO: 234 X10*3/UL (ref 150–450)
POTASSIUM SERPL-SCNC: 4.4 MMOL/L (ref 3.5–5.3)
PROT SERPL-MCNC: 6.8 G/DL (ref 6.4–8.2)
PROTHROMBIN TIME: 9.2 SECONDS (ref 9.8–12.8)
RBC # BLD AUTO: 4.57 X10*6/UL (ref 4.5–5.9)
SODIUM SERPL-SCNC: 142 MMOL/L (ref 136–145)
WBC # BLD AUTO: 5.4 X10*3/UL (ref 4.4–11.3)

## 2023-11-14 PROCEDURE — 80053 COMPREHEN METABOLIC PANEL: CPT

## 2023-11-14 PROCEDURE — 85610 PROTHROMBIN TIME: CPT

## 2023-11-14 PROCEDURE — 1159F MED LIST DOCD IN RCRD: CPT | Performed by: INTERNAL MEDICINE

## 2023-11-14 PROCEDURE — 3078F DIAST BP <80 MM HG: CPT | Performed by: INTERNAL MEDICINE

## 2023-11-14 PROCEDURE — 99215 OFFICE O/P EST HI 40 MIN: CPT | Mod: PO | Performed by: INTERNAL MEDICINE

## 2023-11-14 PROCEDURE — 86706 HEP B SURFACE ANTIBODY: CPT

## 2023-11-14 PROCEDURE — 1160F RVW MEDS BY RX/DR IN RCRD: CPT | Performed by: INTERNAL MEDICINE

## 2023-11-14 PROCEDURE — 36415 COLL VENOUS BLD VENIPUNCTURE: CPT

## 2023-11-14 PROCEDURE — 85025 COMPLETE CBC W/AUTO DIFF WBC: CPT

## 2023-11-14 PROCEDURE — 3075F SYST BP GE 130 - 139MM HG: CPT | Performed by: INTERNAL MEDICINE

## 2023-11-14 PROCEDURE — 82728 ASSAY OF FERRITIN: CPT

## 2023-11-14 PROCEDURE — 99215 OFFICE O/P EST HI 40 MIN: CPT | Performed by: INTERNAL MEDICINE

## 2023-11-14 PROCEDURE — 1036F TOBACCO NON-USER: CPT | Performed by: INTERNAL MEDICINE

## 2023-11-14 PROCEDURE — 82248 BILIRUBIN DIRECT: CPT

## 2023-11-14 PROCEDURE — 82105 ALPHA-FETOPROTEIN SERUM: CPT

## 2023-11-14 PROCEDURE — 1125F AMNT PAIN NOTED PAIN PRSNT: CPT | Performed by: INTERNAL MEDICINE

## 2023-11-14 ASSESSMENT — PATIENT HEALTH QUESTIONNAIRE - PHQ9
2. FEELING DOWN, DEPRESSED OR HOPELESS: NOT AT ALL
1. LITTLE INTEREST OR PLEASURE IN DOING THINGS: NOT AT ALL
SUM OF ALL RESPONSES TO PHQ9 QUESTIONS 1 AND 2: 0

## 2023-11-14 ASSESSMENT — ENCOUNTER SYMPTOMS
OCCASIONAL FEELINGS OF UNSTEADINESS: 0
LOSS OF SENSATION IN FEET: 1
DEPRESSION: 1

## 2023-11-14 ASSESSMENT — COLUMBIA-SUICIDE SEVERITY RATING SCALE - C-SSRS
1. IN THE PAST MONTH, HAVE YOU WISHED YOU WERE DEAD OR WISHED YOU COULD GO TO SLEEP AND NOT WAKE UP?: NO
2. HAVE YOU ACTUALLY HAD ANY THOUGHTS OF KILLING YOURSELF?: NO
6. HAVE YOU EVER DONE ANYTHING, STARTED TO DO ANYTHING, OR PREPARED TO DO ANYTHING TO END YOUR LIFE?: NO

## 2023-11-14 ASSESSMENT — PAIN SCALES - GENERAL: PAINLEVEL: 2

## 2023-11-14 NOTE — PROGRESS NOTES
HEPATOLOGY RETURN PATIENT VISIT    November 14, 2023    Dr. Xiomara Khan       Patient Name:   KEVEN RAYMUNDO  Medical Record Number: [id number]  YOB: 1950    Dear Dr. Khan,    I had the pleasure of seeing Keven Raymundo (along with his wife) for follow-up evaluation in the [facility] ([facility] office). History and physical examination was performed. Pertinent available laboratory, imaging, pathology results were reviewed.  I spent over 25 minutes reviewing his chart in preparation for this visit.    History of Present Illness:   The patient is a 73 year old white male who is referred for follow-up evaluation of ascites and a concern for cirrhosis and possible noncirrhotic portal hypertension and compound heterozygote for hemochromatosis.    The patient has an extremely extensive past medical history as noted below.  Of late, he has had the onset of high-protein low SAAG ascites.  He was seen by someone in the gastroenterology department and referred to me for further evaluation.  I suspect that the concern was determining whether he definitively had cirrhosis or not.    The patient had undergone surgery in January 2018 for bariatric surgery and refractory GERD and a large paraesophageal hernia.  He was found to have a large hiatal hernia with incarcerated transverse colon and greater omentum as well as a liver that grossly appeared to be cirrhotic.  He underwent laparoscopic reduction of the hernia, laparoscopic Christopher-en-Y gastric bypass, and intraoperative liver biopsy.  Interestingly, the biopsy only showed mild fibrosis.    After his Christopher-en-Y surgery, he lost a significant amount of weight.  His maximum weight was 430 pounds.  However, for the last year, he has weighed between 180 pounds and 190 pounds.  He did have some surgical issues including anastomotic ulcer and an internal hernia that required surgery.      He did not have any ascites immediately after  surgery or for the next few years.  However, starting in 4/2022, he began to have onset of ascites along with nausea and abdominal discomfort.  He underwent a CAT scan that showed large amount of ascites and possible nodularity to the liver.  He was admitted to the hospital.  He was seen by one of the gastroenterology APN's (Ameya) who recommended additional work-up and diuretics.  He underwent a 5.7 L paracentesis on 4/15/2022.  There was a concern for possible adhesions and small bowel obstruction which was treated conservatively.  There was no surgical intervention deemed necessary.  The patient was discharged 4/16/2022 on Lasix 20 mg daily and told to make an appointment with hepatology.    He was again admitted to the hospital 4/30/2022 with lower abdominal pain.  Imaging studies again showed dilated loops of small bowel and large ascites. He underwent a 2.94 L paracentesis on 5/2/2022.  He was again seen by Ms. Hou in GI who felt that he had malignant ascites.  He was again sent out on Lasix 20 mg daily. He was again seen by Ms. Hou on 5/24/2022 and was told to remain on Lasix 20 mg daily as well as Aldactone 50 mg daily.  He noticed a good increase in his urine output after being placed on those diuretics.  He is following a low-salt diet.    It was not felt that the ascites was chylous.  The patient reports that the ascites is clear and light yellow in appearance.  He was also seen by his bariatric surgery clinic who recommended that he come see us for the ascites.    The patient had risk factors for fatty liver disease including morbid obesity.  Obviously, he underwent the definitive therapy with bariatric surgery.    The patient denied any past history of acute hepatitis or jaundice.      He has never had any jaundice, hepatic encephalopathy, or variceal bleeding.     He does have significant bilateral lower extremity edema but reports that this is chronic.  He has actually had an IVC filter placed for  DVT and peripheral chronic edema in the right lower extremity because he could not tolerate Eliquis due to bleeding.    He initially saw me in consultation 5/26/2022.  At that time, I did additional evaluation (see results below).  At that time, I suggested echocardiogram, liver biopsy, low-sodium diet, continuing diuretics, and see me back in clinic after the biopsy was done.    He underwent transjugular liver biopsy 6/7/2022.  There was an IVC filter in place but no evidence of clot adherent to that.  The right atrial pressure was 5 mmHg.  The free hepatic venous pressure was 6 mmHg and the wedge pressure was 22 mmHg.  The hepatic venous pressure gradient was 16 mmHg.  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    He underwent a 5 L paracentesis on 6/3/2022.  He underwent a 2.1 L paracentesis on 6/27/2022.    He was seen in the emergency room 7/25/2022 for right lower quadrant pain of 1 day duration.  This was a new pain for him.  It was worse when he laid still and better when he moved around.  He was given pain medication with improvement.  He was also found to have a kidney stone on the ER CAT scan.  He was also found to be positive for COVID-19.    He underwent a 4 L paracentesis on 8/1/2022.  He underwent a 4.8 L paracentesis on 8/30/2022.    He was seen by his cardiologist who removed his IVC filter in early September 2022.  He thinks that his peripheral edema may be slightly better since then.    He underwent diagnostic laparoscopy with laparoscopic peritoneal biopsy, laparoscopic core needle biopsy, and paracentesis with Dr. Parikh on 10/3/2022.  The peritoneum showed some exudate.  The biopsy just showed nonspecific inflammation.  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    When I saw him in clinic in  November 2022, he was building up some fluid.  I adjusted his diuretics to Lasix 40 mg daily and Aldactone 50 mg daily.    He has been having episodes of dizziness and has been seen in the emergency room where he was found to be orthostatic.  He was also seen by neurology.  At some point, it was felt that his diuretics were contributing to his symptoms and his furosemide was stopped.  He was just maintained on spironolactone 50 mg daily at that point.    He continues to get paracentesis about every 4 to 6 weeks.  He underwent a 5.1 L paracentesis on 11/15/2022.  He underwent a 5.5 L paracentesis on 1/11/2023.  He underwent a 3.5 L paracentesis on 2/27/2023.  He underwent a 4.9 L paracentesis on 4/5/2023.    When I saw him in clinic on 5/10/2023, I increased his spironolactone to 75 mg daily.    He underwent a 3 L paracentesis on 7/6/2023.  He underwent a 3.4 L paracentesis on 8/23/2023.  He underwent a 3.5 L paracentesis on 10/3/2023.    He continues to get paracentesis of about 3-1/2 L monthly.  His last paracentesis was 11/13/2023.  Interestingly, he reports that he is now taking spironolactone 150 mg daily (not the 75 mg daily that we had prescribed) and he is still not on any furosemide.    He presented today for follow-up evaluation.  He denied any specific liver-related complaints.  As noted above, he is on spironolactone 150 mg daily but no furosemide.  He is no longer having the orthostatic symptoms.  He is trying to follow a low-sodium diet.  He is trying to avoid artificial salt substitutes.  He also eats one banana a day.  He denied any GI bleeding.  He does have quite significant fatigue.    Past Medical/Surgical History:   ? Obstructive sleep apnea (327.23) (G47.33)  ? Obesity (BMI 30-39.9) (278.00) (E66.9)  ? Abdominal distention (787.3) (R14.0)  ? Actinic keratosis (702.0) (L57.0)  ? Bariatric surgery status (V45.86) (Z98.84)  ? Benign essential HTN (401.1) (I10)  ? Benign essential hypertension  (401.1) (I10)  ? Chronic deep vein thrombosis (DVT) of femoral vein of right lower extremity (453.51)  ? Chronic fatigue (780.79) (R53.82)  ? Class 1 obesity with body mass index (BMI) of 30.0 to 30.9 in adult (278.00,V85.30)  ? Dermatophytosis of groin and perianal area (110.3) (B35.6)  ? DVT prophylaxis (V07.9)  ? Eczema of both external ears (380.22) (H60.543)  ? Elevated triglycerides with high cholesterol (272.2) (E78.2)  ? Encounter for immunization (V03.89) (Z23)  ? Encounter for Medicare annual wellness exam (V70.0) (Z00.00)  ? Encounter for prostate cancer screening (V76.44) (Z12.5)  ? Gastroesophageal reflux disease (530.81) (K21.9)  ? Gastrointestinal bleed (578.9) (K92.2)  ? H/O bariatric surgery (V45.86) (Z98.84)  ? H/O vasomotor rhinitis (V12.69) (Z87.09)  ? Hepatic cirrhosis, unspecified hepatic cirrhosis type, unspecified whether ascites present  ? Hernia, hiatal (553.3) (K44.9)  ? History of elevated glucose (V12.29) (Z86.39)  ? Hypertension, uncontrolled (401.9) (I10)  ? Inability to attain erection (302.72) (N52.9)  ? Intra-abdominal infection (136.9) (B99.9)  ? Low serum iron (280.9) (E61.1)  ? Lymphedema (457.1) (I89.0)  ? Male erectile disorder (607.84) (N52.9)  ? Malnutrition following gastrointestinal surgery (579.3) (K91.2)  ? Medicare annual wellness visit, subsequent (V70.0) (Z00.00)  ? Near syncope (780.2) (R55)  ? Nodule of skin of chest (782.2) (R22.2)  ? Obesity (BMI 30-39.9) (278.00) (E66.9)  ? Obstructive sleep apnea (327.23) (G47.33)  ? Open wound of right lower extremity, sequela (906.1) (S81.801S)  ? Other long term (current) drug therapy (V58.69) (Z79.899)  ? Post-operative pain (338.18) (G89.18)  ? Preop cardiovascular exam (V72.81) (Z01.810)  ? Preoperative clearance (V72.84) (Z01.818)  ? PUD (peptic ulcer disease) (533.90) (K27.9)  ? Pulmonary hypertension (416.8) (I27.20)  ? Refractory ascites (789.59) (R18.8)  ? S/P insertion of inferior vena caval filter (V45.89)  (Z95.828)  ? Screen for colon cancer (V76.51) (Z12.11)  ? Screening for colon cancer (V76.51) (Z12.11)  ? Skin growth (239.2) (D49.2)  ? Skin irritation (709.9) (R23.8)  ? Small bowel obstruction (560.9) (K56.609)  ? SOB (shortness of breath) on exertion (786.05) (R06.02)  ? Spasm of abdominal muscles (728.85) (M62.838)  ? Swelling of both lower extremities (729.81) (M79.89)  ? 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 Tetanus, diphtheria, and acellular pertussis (Tdap) vaccination declined (V64.06)  ? Ulcer, anastomotic (534.90) (K28.9)  ? Urinary incontinence (788.30) (R32)  ? Venous stasis ulcer (454.0) (I83.009,L97.909)  ? Vitamin B12 deficiency (266.2) (E53.8)  ? Vitamin D deficiency (268.9) (E55.9)  ? Wound of right lower extremity, initial encounter (894.0) (S81.801A)  ? History of Abnormal glucose level (790.29) (R73.09)  ? Resolved Date: 11 Nov 2021  ? History of Acute deep vein thrombosis (DVT) of femoral vein of right lower extremity  ? Resolved Date: 11 Nov 2021  ? History of Cellulitis (682.9) (L03.90)  ? Resolved Date: 08 Nov 2016  ? History of Cellulitis of right upper extremity (682.3) (L03.113)  ? Resolved Date: 08 Nov 2016  ? History of Chronic marginal ulcer (534.70) (K28.7)  ? Resolved Date: 11 Nov 2021  ? History of Class 1 obesity with serious comorbidity in adult (278.00) (E66.9)  ? Resolved Date: 11 Nov 2021  ? History of Contusion of left lower leg, initial encounter (924.10) (S80.12XA)  ? Resolved Date: 11 Nov 2021  ? History of Contusion of right lower leg, subsequent encounter (V58.89,924.10)  ? Resolved Date: 11 Nov 2021  ? History of Dry skin dermatitis (692.89) (L85.3)  ? Resolved Date: 22 Apr 2016  ? History of abdominal pain (V13.89) (Z87.898)  ? Resolved Date: 11 Nov 2021  ? History of acute sinusitis (V12.69) (Z87.09)  ? Resolved Date: 03 Dec 2018  ? History of acute sinusitis (V12.69) (Z87.09)  ? Resolved Date: 11 Nov 2021  ? History of anxiety (V11.8) (Z86.59)  ? Resolved Date: 11  2021  ? History of contact dermatitis (V13.3) (Z87.2)  ? Resolved Date: 2021  ? History of dermatitis (V13.3) (Z87.2)  ? Resolved Date: 2021  ? History of dermatitis (V13.3) (Z87.2)  ? Resolved Date: 2020  ? History of lump of right breast (V13.89) (Z87.898)  ? Resolved Date: 2021  ? History of sinusitis (V12.69) (Z87.09)  ? Resolved Date: 2016  ? History of Morbid obesity with body mass index of 60.0-69.9 in adult (278.01,V85.44)  (E66.01,Z68.44)  ? Resolved Date: 2020  ? History of Poison ivy (692.6) (L23.7)  ? Resolved Date: 17 Mar 2015  ? History of Skin growth (239.2) (D49.2)  ? Resolved Date: 2016  ? History of Small bowel obstruction, partial (560.9) (K56.600)  ? Resolved Date: 2021  ? History of Choledochectomy  ? History of Exploratory laparoscopy  ? 2020 - perforated marginal ulcer - Paul patch repair - Surgeon: Axel Simpson MD - [facility] - [phone]  ? History of Exploratory laparotomy  ? 2020 - 2020 - intestinal obstruction, adhesiolysis, laparoscopy converted to  open laparotomy - Surgeon: Axel Simpson MD - [facility]  ? History of Knee Arthroplasty  ? History of Neuroplasty Decompression Median Nerve At Carpal Tunnel  ? History of Repair Of Paraesophageal Hiatus Hernia  ? History of Christopher-en-Y gastric bypass  ? 2018 w/Dr. Mccormick  ? History of Vascular surgical procedure  ? Right IVC  COVID-19    Family History:   ? Family history of Lung Cancer (V16.1)  ? Family history of Thyroid Cancer  ? Family history of Diabetes Mellitus (V18.0)  ? Family history of Obesity  ? Family history of Stroke Syndrome (V17.1)  ? Family history of Alcohol Abuse  Mother  of lung cancer.  Father  of prostate cancer.  There is no known family history of liver disease.    Social History:   He is a former smoker.  He is .  He is retired.  He is a social drinker, but none recently.  He denied tattoos.   He denied blood transfusions.  He denied intravenous drug use.    Review of systems: As noted above.  He does have chronic peripheral edema, right greater than left.  It might be slightly better since having the IVC filter removed.  He does have fatigue.  He does have a low heart rate at times.  He does get nausea and abdominal pain when the ascites is large, but not currently.  He does have dry itchy skin.  He does have issues with anxiety.  He does have weakness.  He has easy bruising.  He has erectile dysfunction.  His weight has been stable.  No fever, chills, weight loss, visual changes, auditory changes, shortness of breath, chest pain, GI bleeding, diarrhea, constipation, depression, dysuria, hematuria, musculoskeletal issues, or rash.       Medical, Surgical, Family, and Social Histories  Past Medical History:   Diagnosis Date    Acute embolism and thrombosis of right femoral vein (CMS/[facility]) 07/15/2021    Acute deep vein thrombosis (DVT) of femoral vein of right lower extremity    Allergic contact dermatitis due to plants, except food 08/18/2014    Poison ivy    Cellulitis of right upper limb 08/08/2016    Cellulitis of right upper extremity    Cellulitis, unspecified 08/08/2016    Cellulitis    Chronic gastrojejunal ulcer without hemorrhage or perforation 04/20/2021    Chronic marginal ulcer    Contusion of left lower leg, initial encounter 05/20/2019    Contusion of left lower leg, initial encounter    Contusion of right lower leg, subsequent encounter 05/22/2019    Contusion of right lower leg, subsequent encounter    Morbid (severe) obesity due to excess calories (CMS/[facility]) 11/21/2019    Morbid obesity with body mass index of 60.0-69.9 in adult    Neoplasm of unspecified behavior of bone, soft tissue, and skin 08/08/2016    Skin growth    Obesity, unspecified 04/20/2021    Class 1 obesity with serious comorbidity in adult    Other abnormal glucose     Abnormal glucose level    Partial intestinal  obstruction, unspecified as to cause (CMS/HCC) 02/01/2021    Small bowel obstruction, partial    Personal history of diseases of the skin and subcutaneous tissue 01/09/2020    History of dermatitis    Personal history of diseases of the skin and subcutaneous tissue 01/09/2020    History of dermatitis    Personal history of diseases of the skin and subcutaneous tissue 10/07/2020    History of contact dermatitis    Personal history of other diseases of the respiratory system 12/03/2018    History of acute sinusitis    Personal history of other diseases of the respiratory system 12/04/2017    History of acute sinusitis    Personal history of other diseases of the respiratory system 11/23/2015    History of sinusitis    Personal history of other mental and behavioral disorders 10/12/2021    History of anxiety    Personal history of other specified conditions 04/06/2021    History of abdominal pain    Personal history of other specified conditions 05/06/2020    History of lump of right breast    Xerosis cutis 01/22/2016    Dry skin dermatitis       Past Surgical History:   Procedure Laterality Date    CARPAL TUNNEL RELEASE  04/09/2013    Neuroplasty Decompression Median Nerve At Carpal Tunnel    IR VENOGRAM HEPATIC  6/7/2022    IR VENOGRAM HEPATIC 6/7/2022 [facility]    KNEE ARTHROPLASTY  04/09/2013    Knee Arthroplasty    MR HEAD ANGIO WO IV CONTRAST  1/6/2020    MR HEAD ANGIO WO IV CONTRAST 1/6/2020 [facility]    MR NECK ANGIO WO IV CONTRAST  1/6/2020    MR NECK ANGIO WO IV CONTRAST 1/6/2020 [facility]    OTHER SURGICAL HISTORY  04/09/2013    Choledochectomy    OTHER SURGICAL HISTORY  02/01/2021    Christopher-en-Y gastric bypass    OTHER SURGICAL HISTORY  02/01/2021    Repair Of Paraesophageal Hiatus Hernia    OTHER SURGICAL HISTORY  12/02/2020    Exploratory laparotomy    OTHER SURGICAL HISTORY  10/07/2022    Exploratory laparoscopy    OTHER SURGICAL HISTORY  05/11/2021    Vascular surgical procedure     US GUIDED ABDOMINAL PARACENTESIS  4/15/2022    US GUIDED ABDOMINAL PARACENTESIS 4/15/2022 [email or web address] CL[facility]ICAL LEGAC[facility]    US GUIDED ABDOMINAL PARACENTESIS  5/2/2022    US GUIDED ABDOMINAL PARACENTESIS 5/2/2022 [facility] [[email or web address] CLINICAL LEGACY    US GUIDED ABDOMINAL PARACENTESIS  6/3/2022    US GUIDED ABDOMINAL PARACENTESIS 6/3/2022 [facility] [email or web address] CLINICAL LEGACY    US GUIDED ABDOMINAL PARACENTESIS  6/27/2022    US GUIDED ABDOMINAL PARACENTESIS 6/27/2022 [facility]    US GUIDED ABDOMINAL PARACENTESIS  1/11/2023    US GUIDED ABDOMINAL PARACENTESIS 1/11/2023 [facility]    US GUIDED ABDOMINAL PARACENTESIS  2/27/2023    US GUIDED ABDOMINAL PARACENTESIS [facility]    US GUIDED ABDOMINAL PARACENTESIS  8/1/2022    US GUIDED ABDOMINAL PARACENTESIS 8/1/2022 [facility]    US GUIDED ABDOMINAL PARACENTESIS  8/30/2022    US GUIDED ABDOMINAL PARACENTESIS 8/30/2022 [facility]    US GUIDED ABDOMINAL PARACENTESIS  11/15/2022    US GUIDED ABDOMINAL PARACENTESIS 11/15/2022 [facility]    US GUIDED ABDOMINAL PARACENTESIS  4/5/2023    US GUIDED ABDOMINAL PARACENTESIS [facility]    US GUIDED ABDOMINAL PARACENTESIS  7/6/2023    US GUIDED ABDOMINAL PARACENTESIS 7/6/2023 [facility]    US GUIDED ABDOMINAL PARACENTESIS  8/23/2023    US GUIDED ABDOMINAL PARACENTESIS 8/23/2023 [facility]    US GUIDED ABDOMINAL PARACENTESIS  5/23/2023    US GUIDED ABDOMINAL PARACENTESIS [facility] CLINICAL LEGACY    US GUIDED ABDOMINAL PARACENTESIS  10/3/2023    US GUIDED ABDOMINAL PARACENTESIS 10/3/2023 [facility]    US GUIDED ABDOMINAL PARACENTESIS  11/13/2023    US GUIDED ABDOMINAL PARACENTESIS 11/13/2023 [facility]       No family history on file.    Social History     Socioeconomic History    Marital status:      Spouse name: Not on file    Number of children: Not on file    Years of education: Not on file    Highest education level: Not on file   Occupational History    Not on file   Tobacco Use    Smoking status: Former     Types: Cigarettes     Quit date: 2003     Years since quitting:  "20.8    Smokeless tobacco: Never   Substance and Sexual Activity    Alcohol use: Not Currently    Drug use: Never    Sexual activity: Not on file   Other Topics Concern    Not on file   Social History Narrative    Not on file     Social Determinants of Health     Financial Resource Strain: Not on file   Food Insecurity: Not on file   Transportation Needs: Not on file   Physical Activity: Not on file   Stress: Not on file   Social Connections: Not on file   Intimate Partner Violence: Not on file   Housing Stability: Not on file       Allergies and Current Medications  No Known Allergies  Current Outpatient Medications   Medication Sig Dispense Refill    CALCIUM CARBONATE-VITAMIN D3 ORAL Take by mouth.      cyanocobalamin, vitamin B-12, 5,000 mcg tablet, sublingual Place under the tongue once daily.      pantoprazole (ProtoNix) 40 mg EC tablet TAKE ONE TABLET BY MOUTH TWO TIMES A DAY, 30 MINUTES BEFORE BREAKFAST AND AT BEDTIME. 180 tablet 0    pediatric multivitamin (Flintstones Multivitamin) tablet,chewable Chew 1 tablet once daily.      spironolactone (Aldactone) 25 mg tablet Take 3 tablets (75 mg) by mouth once daily. 90 tablet 11     No current facility-administered medications for this visit.        Physical Exam  /78 (BP Location: Left arm, Patient Position: Sitting, BP Cuff Size: Adult)   Pulse 53   Ht 1.702 m (5' 7\")   Wt 81.1 kg (178 lb 12.8 oz)   SpO2 95%   BMI 28.00 kg/m²       Physical Examination:   General Appearance: alert and in no acute distress.  He does have significant bitemporal wasting.  HEENT: oropharynx without lesions. Anicteric sclerae.   Neck supple, nontender, without adenopathy, thyromegaly, or JVD.   Lungs clear to auscultation and percussion.   Heart RRR without murmurs, rubs, or gallops.   Abdomen: Soft, nontender, bowel sounds positive, with minimal ascites. Liver and spleen not palpable.  He does have scars from previous surgeries.  Extremities full ROM, no atrophy, normal " strength.  He has 2+ right lower extremity edema with chronic venous stasis changes.  He has 2+ left lower extremity edema with chronic venous stasis changes.  Skin no specific lesions.   Neurological exam nonfocal, alert and oriented.  No asterixis.  He does have a tremor in both hands.  He does have a few spider angiomata, as well as mild bilateral palmar erythema.    Labs 10/16/2023 TSH 3.63.    Labs 9/11/2023 glucose 78, sodium 138, creatinine 0.89, protein 6.2, albumin 3.2, alk phos 65, bilirubin 0.4, AST 17, ALT 11, WBC 6.5, hemoglobin 13.1, platelets 218, INR 1.1.    Labs 5/10/2023 AFP < 4.    Labs 2/11/2023 WBC 4.8, hemoglobin 14.1, platelets 232, INR 1.1, ferritin 425, iron saturation 44%, glucose 56, sodium 137, potassium 3.9, creatinine 0.91, protein 7.1, albumin 3.6, alk phos 88, bilirubin 0.5, AST 20, ALT 12.    Labs 1/3/2023 ammonia 28.    Labs 10/21/2022 WBC 3.9, hemoglobin 14.1, , platelets 61, INR 1.2, glucose 62, sodium 137, potassium 4.4, creatinine 0.77, protein 6.1, albumin 3.1, alk phos 63, bilirubin 0.5, AST 20, ALT 9.    Labs 10/6/2022 platelets 200.    Ascites 10/3/2022 fungal culture negative so far, culture negative, fungal culture negative so far    Labs 9/9/2022 genetic hemochromatosis test reveals [id number] compound heterozygote, TSH 3.57.    Labs 8/26/2022 WBC 5.4, hemoglobin 12.7, , platelets 236, glucose 75, sodium 138, creatinine 0.69.    Labs 7/25/2022 lactate 0.9, WBC 7, hemoglobin 13.3, platelets 235, lipase 9, INR 1, troponin negative, glucose 88, sodium 135, creatinine 0.88, protein 6.9, albumin 3.7, alk phos 58, bilirubin 0.7, AST 15, ALT 7, COVID-19 PCR positive.    Labs 6/4/2022 WBC 5.4, hemoglobin 14.7, , platelets 216, INR 1.1, protein 6.5, albumin 3.5, alk phos 54, bilirubin 0.8, AST 13, ALT 6, glucose 97, sodium 139, potassium 4, creatinine 0.83.    Labs 5/27/2022 T spot negative.    Labs 5/3/2022 WBC 4.6, Hgb 12.6, platelets 200, INR 1.1, glucose  81, sodium 139, creat 0.79, protein 5.8, albumin 3, alk phos 48, bili 0.5, AST 14, ALT 7.    Ascites 5/2/2022 protein 4.6, glucose 90, cytology negative for malignancy, albumin 2.5, AFB culture negative, fungal culture negative, standard bacterial culture negative.    Labs 4/15/2022 ferritin 314, iron sat 25%, AFP < 4, HBsAg -, HCV -, BLANK -, ceruloplasmin 25, AMA -, SMA -, A1AT phenotype MM, HCV-RNA negative.    Ascites 4/15/2022 cytology negative for malignancy, albumin 2.5.    Labs 2/21/2021 HBsAb -, HAV +.    CAT scan with contrast 7/25/2022:  IMPRESSION:  1. 4 mm obstructing calculus in the right distal ureter with minimal  hydronephrosis.  2. Extensive ascites.  3. Possible cirrhosis.  4. Prostatomegaly.    Echocardiogram 6/1/2022:  CONCLUSIONS:  1. The left ventricular systolic function is normal with a 60-65% estimated ejection fraction.  2. Spectral Doppler shows an impaired relaxation pattern of left ventricular diastolic filling.  3. Slightly elevated RVSP.    Upper GI with small bowel follow-through 5/2/2022:  IMPRESSION:  1. Extensive postoperative changes related to gastric bypass surgery.  2. Irregularity of the distal esophagus wall concerning for  stricture. Clinical correlation recommended. Further evaluation with  endoscopy can be performed as clinically warranted to evaluate for  mucosal abnormality.  3. No signs of bowel obstruction or free air.  4. Additional nonacute findings as detailed.    CAT scan with contrast 5/1/2022:  IMPRESSION:  Surgical changes of gastric bypass with hiatal hernia. There are  dilated loops of small bowel throughout the abdomen pelvis with  decompressed loops of small bowel in the lower pelvis. Findings are  most compatible with small bowel obstruction with suspected  transition point in the left mid to lower abdomen.  Large volume ascites.  Nonobstructing stones in the kidneys bilaterally measuring up to 3 mm  on the right and 2 mm on the left.  Enlarged prostate  measures up to 5.6 x 4.8 cm and abuts the posterior  bladder wall. Recommend correlation with PSA values.  The liver appeared normal.    Doppler US 4/16/2022:  IMPRESSION:  Heterogeneity of the liver with surface nodularity suspicious for  cirrhosis.  Partially imaged small to moderate volume ascites.  Major hepatic vasculature appear patent with normal directional flow.    CAT scan with contrast 4/14/2022:  IMPRESSION:  1. Unusual configuration of the bowel with multiple pathologically  distended loops of small bowel with paucity of bowel loops within the  right abdomen, midline cecum, and majority of small bowel loops  within the midline or left-sided the abdomen. Constellation of  findings is concerning for small bowel obstruction with possible  internal hernia.  2. Massive intra-abdominal and pelvic ascites.  3. Very subtle nodularity the liver which may indicate cirrhotic  morphology.    CAT scan with contrast 2/20/2021 revealed a small amount of free fluid in the abdomen, hepatomegaly.    CAT scan with contrast 11/17/2020 showed a normal liver and no evidence of free fluid.    Echocardiogram 1/6/2020:  CONCLUSIONS:  1. The left ventricular systolic function is normal with a 65-70% estimated ejection fraction.  2. Spectral Doppler shows an impaired relaxation pattern of left ventricular diastolic filling.    EGD 3/5/2021:  Impression: - Normal upper third of esophagus, middle third of  esophagus and lower third of esophagus.  - Z-line regular, 35 cm from the incisors.  - 4 cm hiatal hernia.  - Gastric bypass with a pouch 6 cm in length and  intact staple line. Gastrojejunal anastomosis  characterized by an intact appearance and ulceration.  - No specimens collected.    Liver biopsy and peritoneal biopsy 10/3/2022:  FINAL DIAGNOSIS  A. PERITONEAL BIOPSY:  -- FIBROUS TISSUE WITH CHRONIC INFLAMMATION.  B. LIVER BIOPSY:  -- LIVER TISSUE WITH MILD SINUSOIDAL DILATATION AND VAGUE NODULAR PATTERN, SEE  NOTE.  -- NO  EVIDENCE OF MALIGNANCY.  Note: The majority of the biopsy shows mild sinusoidal dilatation, areas of  vague nodular pattern, and portal triads with mild inflammation and without  interface activity. 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 Although a small fragment of liver tissue shows  bridging fibrosis, it can also represent a subcapsular area with increased  fibrosis. No significant steatosis or hepatocyte injury is identified.  Overall, no definitive feature of cirrhosis is seen. A noncirrhotic portal  hypertension etiology should be included in the differential diagnosis.  Iron stain reveals mild deposition in the hepatocytes. PAS/D stain does not  reveal intracytoplasmic globules. Trichrome highlights focal bridging fibrosis.  Reticulin shows areas of compression of hepatic cords.      Liver Biopsy 6/1/2022:  FINAL DIAGNOSIS  A. LIVER:  -- MILD PORTAL AND PERIPORTAL FIBROSIS WITHOUT EVIDENCE OF BRIDGING FIBROSIS OR  CIRRHOSIS; SEE COMMENT.  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  -- ZONE 3 SINUSOIDAL DILATATION AND CONGESTION.  -- MODERATE IRON DEPOSITION IN AT LEAST 50% OF HEPATOCYTES.  COMMENT: The biopsy shows portal tracts with mild chronic inflammation,  consisting predominantly of lymphocytes with lesser amounts of plasma cells and  eosinophils. A non-specific mild bile ductular reaction is present. Bile  ducts appear intact without evidence of damage or inflammation. There is no  evidence of interface hepatitis or lobular inflammation. Steatosis is minimal  (less than 5%) and there is no evidence of ballooning degeneration or  Kenyetta-Denk bodies to suggest steatohepatitis.  A trichrome stains shows portal and periportal fibrosis with mild zone 3  pericellular fibrosis (fibrosis stage = 1-2 of 4, Eliaz-Alexander). Bridging  fibrosis or cirrhosis is not evident. A reticulin stain highlights zone 3  hepatocellular atrophy and a vague  "\"nodular\" appearance, compatible with an  \"NRH\"-like pattern of injury. An iron stain shows moderate iron deposition in  zone 3 regions, with prominent granular deposition in periportal hepatocytes  (approximately 50% overall). A PAS-D stain for alpha-1-antitrypsin globules is  negative.  Overall, the histopathologic features are not specific as to a single  diagnostic entity but are compatible with so-called \"non-cirrhotic portal  hypertension\" (note is made of the clinical presence of portal hypertension  during transjugular liver biopsy). The etiologies for non-cirrhotic portal  hypertension are numerous and require clinical correlation. In addition, given  the presence of increased iron deposition, exclusion of hereditary  hemochromatosis is necessary.    Liver biopsy 1/30/2018:  A. LIVER, CORE BIOPSY:  -- LIMITED HEPATIC PARENCHYMA WITH MILD PORTAL TRACT MIXED CHRONIC  INFLAMMATION, FOCAL MILD INTERFACE ACTIVITY AND FOCUS OF LOBULAR ACTIVITY.  -- PORTAL FIBROSIS, NO EVIDENCE OF BRIDGING FIBROSIS OR CIRRHOSIS.  -- MILD IRON ACCUMULATION IN APPROXIMATELY 50% OF HEPATOCYTES.  -- MILD STEATOSIS, APPROXIMATELY 10%.  -- SEE COMMENT.  COMMENT: Interpretation of the biopsy is limited by small sample size and  limited number of portal tracts (7) available for review. There is mild portal  tract chronic inflammation consisting mostly of lymphocytes with occasional  plasma cells. There is minimal interface activity and rare foci of lobular  inflammation. There is no evidence of cholestasis, ballooning degeneration, or  Kenyetta-Denk bodies. Steatosis is macrovesicular and approximately 10%. There  is focal bile duct proliferation in larger portal tracts, which is  non-specific. There is focal portal tract fibrosis but no evidence of bridging  fibrosis in this limited biopsy (Eliza-Alexander, Stage 1). There is mild iron  accumulation in the majority of hepatocytes, with accentuation in a zone 1  distribution. While " non-specific, genetic testing for hereditary  hemochromatosis should be considered, if clinically indicated. Finally, there  is no evidence of abnormal intracytoplasmic accumulation of  alpha-1-antitrypsin. Iron, trichrome, reticulin and PAS-D stains have been  reviewed. Clinical and serologic correlation is recommended.          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    He obviously has a very complicated past history with multiple surgeries and complications from his bariatric surgery in 2018.    Interestingly, during his surgery in 2018 the surgeon noted that his liver looked grossly cirrhotic.  However, his liver biopsy intraoperatively in January 2018 only showed mild scar tissue.  This is obviously a huge discrepancy.  If he really did only have mild fibrosis, it would be very unlikely that he has progressed to cirrhosis.  On the other hand, if he did have cirrhosis, he obviously could have progressed over time.  Obviously, his main risk factor would be AWAD cirrhosis, but he has undergone the definitive treatment of bariatric surgery with weight loss.    Also confusing is the fact that his different imaging studies have shown a normal liver ranging all the way to cirrhosis.  Also, except for a low albumin, his liver tests and platelets are perfectly normal.    Of note, his ascites fluid is unusual and that it has a very high protein and a very low serum ascites-albumin gradient (SAAG).  The differential for this would usually include malignancy, TB, or fungal infections.  His cytology was negative twice.  His T spot test was negative.  His ascites from 5/2/2022 was checked for TB and fungus.  Those tests  were negative.  Echocardiogram and transjugular liver biopsy with pressures did not show significant evidence of heart failure.  However, the biopsy did show elevated hepatic venous pressure gradient of 16 mmHg.    His TSH was normal.    Given all of these discrepancies, we did repeat a transjugular liver biopsy in June 2022.  As noted above, he did not have cirrhosis or advanced fibrosis.  He did have portal hypertension based on pressure measurements.  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    It is possible that he has a multifactorial ascites.  However, there was no evidence of malignancy, infection, or heart failure.  Nodular regenerative hyperplasia can be caused by medications (such as chemotherapy which he is not on), neoplasia, hematologic conditions, infections, autoimmune conditions, or can be idiopathic.  Again, before we assume this diagnosis, the question was whether we should consider diagnostic laparoscopy.  Given the very unusual characteristics of his ascites with the very high protein and low SAAG, I favored a diagnostic laparoscopy.     As noted above, he underwent laparoscopy 10/3/2022 which did show some exudate on the peritoneum, and a soft liver.  The biopsy showed focal stage 3 fibrosis.  I did speak with the surgeon via email on 11/8/2022.  He felt that the exudate was likely some leftover mild inflammation but would not be enough to explain the ascites.  He also did not feel that the patient had cirrhosis grossly to explain ascites.    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  The exact etiology of the nodular regenerative hyperplasia is not clear in his case.  For now, I would try to treat him with diuretics and low-sodium diet.  He seems to be doing reasonably well with  his spironolactone 150 mg daily (even though we had recommended 75 mg daily).  I will recheck his labs today.  Depending on his electrolytes, we may adjust the spironolactone or even put him back on a very low-dose of Lasix to see how he does.  Luckily, he is only getting moderate volume paracenteses every month.    If he eventually fails diuretics, we can consider treatment such as TIPS even in a patient with noncirrhotic portal hypertension and nodular regenerative hyperplasia.  I am concerned about the potential for encephalopathy from TIPS.    He can get AFP and ultrasound every 6 months.    He is immune to hepatitis A.    He is not immune to hepatitis B.  He completed [facility] at the [facility].  We will check for immunity with his labs today.    At the recommendation of the pathologist based on increased iron on his liver biopsy, I did recommend a genetic hemochromatosis test.  He is a compound heterozygote for hemochromatosis.  However, he only has mild iron in his biopsy.  Also, even though his ferritin is mildly elevated, his iron saturation is low normal.  For these reasons, I would not recommend phlebotomy at this time.  We will continue to check iron panel and ferritin every 6 months.  The latest values again showed no significant elevations that would warrant phlebotomy.      Otherwise, I will have him get labs every 2 months and see him back in about 6 months.  Depending on his overall course when I see him back in about 6 months, we can discuss other options such as of the TIPS.    He can follow-up with [facility] for his GI needs.    Thank you for allowing me to participate in the care of this patient. Please feel free to contact me with any questions regarding their care.     Sincerely,     Riley Gaspar MD, FAASLD, FACG.   Medical Director, Hepatology  [facility]  [facility]  Professor of Medicine  Division of Gastroenterology and Liver  Disease  [address] [address]  Phone: [phone]  Fax: [phone].      Cc: Dr. Da Silva  Cc: SHANICE Negrete (GI)      This document was generated with a computerized dictation system.  Because of this, there could be errors in grammar and/or content.

## 2023-11-14 NOTE — PATIENT INSTRUCTIONS
Continue to follow a low-sodium diet.  Avoid salt substitutes.    Get labs today, in 2 months, in 4 months, and in 6 months.    Get an ultrasound now and in 6 months.    See me back in clinic in about 6 to 7 months.

## 2023-11-17 ENCOUNTER — APPOINTMENT (OUTPATIENT)
Dept: RADIOLOGY | Facility: HOSPITAL | Age: 73
End: 2023-11-17
Payer: MEDICARE

## 2023-11-17 DIAGNOSIS — R18.8 OTHER ASCITES: ICD-10-CM

## 2023-11-17 NOTE — TELEPHONE ENCOUNTER
----- Message from Riley Gaspar MD sent at 11/15/2023  6:45 AM EST -----  We can start him on Lasix 20 mg daily and increase his Aldactone to 100 mg daily. He can get a repeat BMP in 2-3 weeks.    Called patient to discuss results and plan per Dr Gaspar:  Spoke to patient discussed medication changes patient verbalized understanding.

## 2023-11-20 RX ORDER — FUROSEMIDE 20 MG/1
20 TABLET ORAL DAILY
Qty: 30 TABLET | Refills: 11 | Status: SHIPPED | OUTPATIENT
Start: 2023-11-20 | End: 2024-01-18 | Stop reason: SDUPTHER

## 2023-11-20 RX ORDER — SPIRONOLACTONE 25 MG/1
100 TABLET ORAL DAILY
Qty: 120 TABLET | Refills: 11 | Status: SHIPPED | OUTPATIENT
Start: 2023-11-20 | End: 2024-01-18 | Stop reason: SDUPTHER

## 2023-11-22 DIAGNOSIS — J01.90 ACUTE NON-RECURRENT SINUSITIS, UNSPECIFIED LOCATION: ICD-10-CM

## 2023-11-22 RX ORDER — AZITHROMYCIN 250 MG/1
TABLET, FILM COATED ORAL
Qty: 6 TABLET | Refills: 0 | Status: SHIPPED | OUTPATIENT
Start: 2023-11-22 | End: 2023-12-13 | Stop reason: WASHOUT

## 2023-11-24 ENCOUNTER — HOSPITAL ENCOUNTER (OUTPATIENT)
Dept: RADIOLOGY | Facility: HOSPITAL | Age: 73
Discharge: HOME | End: 2023-11-24
Payer: MEDICARE

## 2023-11-24 DIAGNOSIS — K74.60 CIRRHOSIS OF LIVER WITH ASCITES, UNSPECIFIED HEPATIC CIRRHOSIS TYPE (MULTI): ICD-10-CM

## 2023-11-24 DIAGNOSIS — R18.8 CIRRHOSIS OF LIVER WITH ASCITES, UNSPECIFIED HEPATIC CIRRHOSIS TYPE (MULTI): ICD-10-CM

## 2023-11-24 DIAGNOSIS — E83.110 HEREDITARY HEMOCHROMATOSIS (CMS-HCC): ICD-10-CM

## 2023-11-24 PROCEDURE — 76705 ECHO EXAM OF ABDOMEN: CPT

## 2023-11-24 PROCEDURE — 76705 ECHO EXAM OF ABDOMEN: CPT | Performed by: STUDENT IN AN ORGANIZED HEALTH CARE EDUCATION/TRAINING PROGRAM

## 2023-12-13 ENCOUNTER — OFFICE VISIT (OUTPATIENT)
Dept: PRIMARY CARE | Facility: CLINIC | Age: 73
End: 2023-12-13
Payer: MEDICARE

## 2023-12-13 VITALS
WEIGHT: 177.4 LBS | DIASTOLIC BLOOD PRESSURE: 78 MMHG | OXYGEN SATURATION: 99 % | TEMPERATURE: 97.2 F | SYSTOLIC BLOOD PRESSURE: 132 MMHG | HEART RATE: 52 BPM | BODY MASS INDEX: 27.78 KG/M2

## 2023-12-13 DIAGNOSIS — F32.A DEPRESSION, UNSPECIFIED DEPRESSION TYPE: ICD-10-CM

## 2023-12-13 DIAGNOSIS — I27.20 PULMONARY HYPERTENSION (MULTI): ICD-10-CM

## 2023-12-13 DIAGNOSIS — K21.9 GASTROESOPHAGEAL REFLUX DISEASE, UNSPECIFIED WHETHER ESOPHAGITIS PRESENT: ICD-10-CM

## 2023-12-13 DIAGNOSIS — R25.1 TREMORS OF NERVOUS SYSTEM: Primary | ICD-10-CM

## 2023-12-13 DIAGNOSIS — K74.60 CIRRHOSIS OF LIVER WITH ASCITES, UNSPECIFIED HEPATIC CIRRHOSIS TYPE (MULTI): ICD-10-CM

## 2023-12-13 DIAGNOSIS — R18.8 CIRRHOSIS OF LIVER WITH ASCITES, UNSPECIFIED HEPATIC CIRRHOSIS TYPE (MULTI): ICD-10-CM

## 2023-12-13 PROCEDURE — 1036F TOBACCO NON-USER: CPT | Performed by: INTERNAL MEDICINE

## 2023-12-13 PROCEDURE — 1125F AMNT PAIN NOTED PAIN PRSNT: CPT | Performed by: INTERNAL MEDICINE

## 2023-12-13 PROCEDURE — 1159F MED LIST DOCD IN RCRD: CPT | Performed by: INTERNAL MEDICINE

## 2023-12-13 PROCEDURE — 3078F DIAST BP <80 MM HG: CPT | Performed by: INTERNAL MEDICINE

## 2023-12-13 PROCEDURE — 1160F RVW MEDS BY RX/DR IN RCRD: CPT | Performed by: INTERNAL MEDICINE

## 2023-12-13 PROCEDURE — 3075F SYST BP GE 130 - 139MM HG: CPT | Performed by: INTERNAL MEDICINE

## 2023-12-13 PROCEDURE — 99214 OFFICE O/P EST MOD 30 MIN: CPT | Performed by: INTERNAL MEDICINE

## 2023-12-13 RX ORDER — BUPROPION HYDROCHLORIDE 75 MG/1
75 TABLET ORAL 2 TIMES DAILY
Qty: 60 TABLET | Refills: 5 | COMMUNITY
Start: 2023-12-13

## 2023-12-13 RX ORDER — PANTOPRAZOLE SODIUM 40 MG/1
TABLET, DELAYED RELEASE ORAL
Qty: 180 TABLET | Refills: 1 | Status: SHIPPED | OUTPATIENT
Start: 2023-12-13 | End: 2024-03-13 | Stop reason: SDUPTHER

## 2023-12-13 ASSESSMENT — ENCOUNTER SYMPTOMS
PALPITATIONS: 0
BRUISES/BLEEDS EASILY: 0
FATIGUE: 1
DIARRHEA: 0
ARTHRALGIAS: 0
HEADACHES: 0
TREMORS: 1
SORE THROAT: 0
HYPERTENSION: 1
DIFFICULTY URINATING: 0
DIZZINESS: 0
BLOOD IN STOOL: 0
COUGH: 0
WHEEZING: 0
UNEXPECTED WEIGHT CHANGE: 0
ABDOMINAL PAIN: 0
FEVER: 0
SINUS PAIN: 0

## 2023-12-13 NOTE — PROGRESS NOTES
Subjective   Patient ID: William Philip is a 73 y.o. male who presents for Hypertension and Fatigue (When gets tired hands will shake).    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   Fatigue  Associated symptoms include fatigue. Pertinent negatives include no abdominal pain, arthralgias, chest pain, congestion, coughing, fever, headaches, rash or sore throat.          Review of Systems   Constitutional:  Positive for fatigue. Negative for fever and unexpected weight change.   HENT:  Negative for congestion, ear discharge, ear pain, mouth sores, sinus pain and sore throat.    Eyes:  Negative for visual disturbance.   Respiratory:  Negative for cough and wheezing.    Cardiovascular:  Negative for chest pain, palpitations and leg swelling.   Gastrointestinal:  Negative for abdominal pain, blood in stool and diarrhea.   Genitourinary:  Negative for difficulty urinating.   Musculoskeletal:  Negative for  arthralgias.   Skin:  Negative for rash.   Neurological:  Positive for tremors. Negative for dizziness and headaches.   Hematological:  Does not bruise/bleed easily.   Psychiatric/Behavioral:  Negative for behavioral problems.    All other systems reviewed and are negative.      Objective   Lab Results   Component Value Date    HGBA1C 4.6 05/14/2021      /78   Pulse 52   Temp 36.2 °C (97.2 °F)   Wt 80.5 kg (177 lb 6.4 oz)   SpO2 99%   BMI 27.78 kg/m²   Lab Results   Component Value Date    WBC 5.4 11/14/2023    HGB 14.0 11/14/2023    HCT 45.4 11/14/2023     11/14/2023    CHOL 93 01/06/2020    TRIG 62 01/06/2020    HDL 40.0 01/06/2020    ALT 10 11/14/2023    AST 19 11/14/2023     11/14/2023    K 4.4 11/14/2023     11/14/2023    CREATININE 0.84 11/14/2023    BUN 12 11/14/2023    CO2 26 11/14/2023    TSH 3.63 10/16/2023    PSA 1.20 05/02/2022    INR 0.8 (L) 11/14/2023    HGBA1C 4.6 05/14/2021     par   Physical Exam  Vitals and nursing note reviewed.   Constitutional:       Appearance: Normal appearance.   HENT:      Head: Normocephalic.      Nose: Nose normal.   Eyes:      Conjunctiva/sclera: Conjunctivae normal.      Pupils: Pupils are equal, round, and reactive to light.   Cardiovascular:      Rate and Rhythm: Regular rhythm.   Pulmonary:      Effort: Pulmonary effort is normal.      Breath sounds: Normal breath sounds.   Abdominal:      General: Abdomen is flat. There is distension.      Palpations: Abdomen is soft.      Tenderness: There is no right CVA tenderness, left CVA tenderness, guarding or rebound.   Musculoskeletal:      Cervical back: Neck supple.   Skin:     General: Skin is warm.   Neurological:      General: No focal deficit present.      Mental Status: He is oriented to person, place, and time.      Cranial Nerves: No cranial nerve deficit.      Sensory: No sensory deficit.      Motor: No weakness.      Coordination: Coordination normal.      Gait: Gait normal.      Deep  Tendon Reflexes: Reflexes normal.   Psychiatric:         Mood and Affect: Mood normal.         Thought Content: Thought content normal.         Judgment: Judgment normal.      Comments: Essential tremors         Assessment/Plan   William was seen today for hypertension and fatigue.  Diagnoses and all orders for this visit:  Tremors of nervous system (Primary)  Gastroesophageal reflux disease, unspecified whether esophagitis present  -     pantoprazole (ProtoNix) 40 mg EC tablet; TAKE ONE TABLET BY MOUTH TWO TIMES A DAY, 30 MINUTES BEFORE BREAKFAST AND AT BEDTIME.  Cirrhosis of liver with ascites, unspecified hepatic cirrhosis type (CMS/HCC)  Pulmonary hypertension (CMS/HCC)  Depression, unspecified depression type  Other orders  -     Follow Up In Primary Care - Established  -     Follow Up In Primary Care - Established; Future   -Recent blood work and plan of care reviewed with patient  - [facility] patient started on bupropion 75 mg twice a day counseled about medication side effect May continue twice a day as recommended  -Continue low-salt diet  -Tremors in both hands when he gets tired patient counseled about essential tremors no need for treatment at this time continue with conservative measures high risk for dizziness  -Fatigue and tiredness underlying sleep apnea  Continue CPAP  - Liver cirrhosis stage III, portal hypertension 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

## 2023-12-18 ENCOUNTER — ANCILLARY PROCEDURE (OUTPATIENT)
Dept: RADIOLOGY | Facility: HOSPITAL | Age: 73
End: 2023-12-18
Payer: MEDICARE

## 2023-12-18 VITALS
RESPIRATION RATE: 16 BRPM | HEART RATE: 56 BPM | OXYGEN SATURATION: 100 % | DIASTOLIC BLOOD PRESSURE: 58 MMHG | SYSTOLIC BLOOD PRESSURE: 122 MMHG

## 2023-12-18 DIAGNOSIS — R18.8 OTHER ASCITES: ICD-10-CM

## 2023-12-18 PROCEDURE — 49083 ABD PARACENTESIS W/IMAGING: CPT

## 2023-12-18 PROCEDURE — 49083 ABD PARACENTESIS W/IMAGING: CPT | Performed by: RADIOLOGY

## 2023-12-18 PROCEDURE — 2720000007 HC OR 272 NO HCPCS

## 2023-12-18 ASSESSMENT — PAIN SCALES - GENERAL
PAINLEVEL_OUTOF10: 0 - NO PAIN
PAINLEVEL_OUTOF10: 0 - NO PAIN
PAINLEVEL_OUTOF10: 1

## 2023-12-18 ASSESSMENT — PAIN - FUNCTIONAL ASSESSMENT: PAIN_FUNCTIONAL_ASSESSMENT: 0-10

## 2024-01-15 ENCOUNTER — LAB (OUTPATIENT)
Dept: LAB | Facility: LAB | Age: 74
End: 2024-01-15
Payer: MEDICARE

## 2024-01-15 DIAGNOSIS — E83.110 HEREDITARY HEMOCHROMATOSIS (CMS-HCC): ICD-10-CM

## 2024-01-15 DIAGNOSIS — R18.8 CIRRHOSIS OF LIVER WITH ASCITES, UNSPECIFIED HEPATIC CIRRHOSIS TYPE (MULTI): ICD-10-CM

## 2024-01-15 DIAGNOSIS — R18.8 OTHER ASCITES: ICD-10-CM

## 2024-01-15 DIAGNOSIS — K74.60 CIRRHOSIS OF LIVER WITH ASCITES, UNSPECIFIED HEPATIC CIRRHOSIS TYPE (MULTI): ICD-10-CM

## 2024-01-15 LAB
ALBUMIN SERPL BCP-MCNC: 3.9 G/DL (ref 3.4–5)
ALP SERPL-CCNC: 74 U/L (ref 33–136)
ALT SERPL W P-5'-P-CCNC: 13 U/L (ref 10–52)
ANION GAP SERPL CALC-SCNC: 11 MMOL/L (ref 10–20)
AST SERPL W P-5'-P-CCNC: 19 U/L (ref 9–39)
BASOPHILS # BLD AUTO: 0.07 X10*3/UL (ref 0–0.1)
BASOPHILS NFR BLD AUTO: 1.2 %
BILIRUB DIRECT SERPL-MCNC: 0.1 MG/DL (ref 0–0.3)
BILIRUB SERPL-MCNC: 0.3 MG/DL (ref 0–1.2)
BUN SERPL-MCNC: 22 MG/DL (ref 6–23)
CALCIUM SERPL-MCNC: 9.4 MG/DL (ref 8.6–10.3)
CHLORIDE SERPL-SCNC: 103 MMOL/L (ref 98–107)
CO2 SERPL-SCNC: 28 MMOL/L (ref 21–32)
CREAT SERPL-MCNC: 1 MG/DL (ref 0.5–1.3)
EGFRCR SERPLBLD CKD-EPI 2021: 79 ML/MIN/1.73M*2
EOSINOPHIL # BLD AUTO: 0.12 X10*3/UL (ref 0–0.4)
EOSINOPHIL NFR BLD AUTO: 2 %
ERYTHROCYTE [DISTWIDTH] IN BLOOD BY AUTOMATED COUNT: 12.8 % (ref 11.5–14.5)
GLUCOSE SERPL-MCNC: 43 MG/DL (ref 74–99)
HCT VFR BLD AUTO: 47.9 % (ref 41–52)
HGB BLD-MCNC: 14.9 G/DL (ref 13.5–17.5)
IMM GRANULOCYTES # BLD AUTO: 0.02 X10*3/UL (ref 0–0.5)
IMM GRANULOCYTES NFR BLD AUTO: 0.3 % (ref 0–0.9)
INR PPP: 1 (ref 0.9–1.1)
LYMPHOCYTES # BLD AUTO: 1.81 X10*3/UL (ref 0.8–3)
LYMPHOCYTES NFR BLD AUTO: 30.4 %
MCH RBC QN AUTO: 31.3 PG (ref 26–34)
MCHC RBC AUTO-ENTMCNC: 31.1 G/DL (ref 32–36)
MCV RBC AUTO: 101 FL (ref 80–100)
MONOCYTES # BLD AUTO: 0.37 X10*3/UL (ref 0.05–0.8)
MONOCYTES NFR BLD AUTO: 6.2 %
NEUTROPHILS # BLD AUTO: 3.57 X10*3/UL (ref 1.6–5.5)
NEUTROPHILS NFR BLD AUTO: 59.9 %
NRBC BLD-RTO: 0 /100 WBCS (ref 0–0)
PLATELET # BLD AUTO: 203 X10*3/UL (ref 150–450)
POTASSIUM SERPL-SCNC: 4.3 MMOL/L (ref 3.5–5.3)
PROT SERPL-MCNC: 7.4 G/DL (ref 6.4–8.2)
PROTHROMBIN TIME: 11.4 SECONDS (ref 9.8–12.8)
RBC # BLD AUTO: 4.76 X10*6/UL (ref 4.5–5.9)
SODIUM SERPL-SCNC: 138 MMOL/L (ref 136–145)
WBC # BLD AUTO: 6 X10*3/UL (ref 4.4–11.3)

## 2024-01-15 PROCEDURE — 36415 COLL VENOUS BLD VENIPUNCTURE: CPT

## 2024-01-18 ENCOUNTER — TELEPHONE (OUTPATIENT)
Dept: GASTROENTEROLOGY | Facility: HOSPITAL | Age: 74
End: 2024-01-18

## 2024-01-18 DIAGNOSIS — R18.8 OTHER ASCITES: ICD-10-CM

## 2024-01-18 RX ORDER — SPIRONOLACTONE 25 MG/1
100 TABLET ORAL DAILY
Qty: 120 TABLET | Refills: 11 | Status: SHIPPED | OUTPATIENT
Start: 2024-01-18

## 2024-01-18 RX ORDER — FUROSEMIDE 20 MG/1
20 TABLET ORAL DAILY
Qty: 30 TABLET | Refills: 11 | Status: SHIPPED | OUTPATIENT
Start: 2024-01-18 | End: 2025-01-17

## 2024-01-29 ENCOUNTER — HOSPITAL ENCOUNTER (OUTPATIENT)
Dept: RADIOLOGY | Facility: HOSPITAL | Age: 74
Discharge: HOME | End: 2024-01-29
Payer: MEDICARE

## 2024-01-29 VITALS — HEART RATE: 54 BPM | RESPIRATION RATE: 18 BRPM | SYSTOLIC BLOOD PRESSURE: 135 MMHG | DIASTOLIC BLOOD PRESSURE: 79 MMHG

## 2024-01-29 DIAGNOSIS — R18.8 OTHER ASCITES: ICD-10-CM

## 2024-01-29 PROCEDURE — 49083 ABD PARACENTESIS W/IMAGING: CPT | Performed by: RADIOLOGY

## 2024-01-29 PROCEDURE — 49083 ABD PARACENTESIS W/IMAGING: CPT

## 2024-01-29 PROCEDURE — 2720000007 HC OR 272 NO HCPCS

## 2024-01-29 ASSESSMENT — PAIN - FUNCTIONAL ASSESSMENT: PAIN_FUNCTIONAL_ASSESSMENT: 0-10

## 2024-01-29 NOTE — PRE-PROCEDURE NOTE
Interventional Radiology Preprocedure Note    Indication for procedure: The encounter diagnosis was Other ascites.    Relevant review of systems: NA    Relevant Labs:   Lab Results   Component Value Date    CREATININE 1.00 01/15/2024    EGFR 79 01/15/2024    INR 1.0 01/15/2024    PROTIME 11.4 01/15/2024       Planned Sedation/Anesthesia: Minimal    Airway assessment:  NA    Directed physical examination:    NA    Mallampati:  NA    ASA Score: ASA 2 - Patient with mild systemic disease with no functional limitations    Benefits, risks and alternatives of procedure and planned sedation have been discussed with the patient and/or their representative. All questions answered and they agree to proceed.

## 2024-01-29 NOTE — POST-PROCEDURE NOTE
Interventional Radiology Brief Postprocedure Note    Attending: Stephen Isaac MD    Assistant: None    Diagnosis: Ascites    Description of procedure: Ultrasound guided paracentesis     Anesthesia:  Local    Complications: None immediate    Estimated Blood Loss: minimal    Medications (Filter: Administrations occurring from 1349 to 1559 on 01/29/24)      None          No specimens collected      See detailed result report with images in PACS.    The patient tolerated the procedure well without incident or complication and is in stable condition.

## 2024-02-20 ENCOUNTER — TELEPHONE (OUTPATIENT)
Dept: GASTROENTEROLOGY | Facility: CLINIC | Age: 74
End: 2024-02-20
Payer: MEDICARE

## 2024-02-20 NOTE — TELEPHONE ENCOUNTER
Spoke to patient he is having some pain after last paracentesis, denies fever, chills, nausea and vomiting. Patient will call to schedule follow up appointment with Dr Gaspar.

## 2024-02-27 ENCOUNTER — HOSPITAL ENCOUNTER (OUTPATIENT)
Dept: RADIOLOGY | Facility: HOSPITAL | Age: 74
End: 2024-02-27
Payer: MEDICARE

## 2024-03-04 ENCOUNTER — LAB (OUTPATIENT)
Dept: LAB | Facility: LAB | Age: 74
End: 2024-03-04
Payer: MEDICARE

## 2024-03-04 DIAGNOSIS — K74.60 CIRRHOSIS OF LIVER WITH ASCITES, UNSPECIFIED HEPATIC CIRRHOSIS TYPE (MULTI): ICD-10-CM

## 2024-03-04 DIAGNOSIS — R18.8 CIRRHOSIS OF LIVER WITH ASCITES, UNSPECIFIED HEPATIC CIRRHOSIS TYPE (MULTI): ICD-10-CM

## 2024-03-04 DIAGNOSIS — E83.110 HEREDITARY HEMOCHROMATOSIS (CMS-HCC): ICD-10-CM

## 2024-03-04 LAB
ALBUMIN SERPL BCP-MCNC: 4.2 G/DL (ref 3.4–5)
ALP SERPL-CCNC: 72 U/L (ref 33–136)
ALT SERPL W P-5'-P-CCNC: 15 U/L (ref 10–52)
ANION GAP SERPL CALC-SCNC: 16 MMOL/L (ref 10–20)
AST SERPL W P-5'-P-CCNC: 25 U/L (ref 9–39)
BASOPHILS # BLD AUTO: 0.06 X10*3/UL (ref 0–0.1)
BASOPHILS NFR BLD AUTO: 1.4 %
BILIRUB DIRECT SERPL-MCNC: 0.2 MG/DL (ref 0–0.3)
BILIRUB SERPL-MCNC: 0.6 MG/DL (ref 0–1.2)
BUN SERPL-MCNC: 18 MG/DL (ref 6–23)
CALCIUM SERPL-MCNC: 9.9 MG/DL (ref 8.6–10.3)
CHLORIDE SERPL-SCNC: 100 MMOL/L (ref 98–107)
CO2 SERPL-SCNC: 25 MMOL/L (ref 21–32)
CREAT SERPL-MCNC: 1.1 MG/DL (ref 0.5–1.3)
EGFRCR SERPLBLD CKD-EPI 2021: 70 ML/MIN/1.73M*2
EOSINOPHIL # BLD AUTO: 0.09 X10*3/UL (ref 0–0.4)
EOSINOPHIL NFR BLD AUTO: 2.1 %
ERYTHROCYTE [DISTWIDTH] IN BLOOD BY AUTOMATED COUNT: 12.7 % (ref 11.5–14.5)
GLUCOSE SERPL-MCNC: 78 MG/DL (ref 74–99)
HCT VFR BLD AUTO: 45.5 % (ref 41–52)
HGB BLD-MCNC: 14.2 G/DL (ref 13.5–17.5)
IMM GRANULOCYTES # BLD AUTO: 0.02 X10*3/UL (ref 0–0.5)
IMM GRANULOCYTES NFR BLD AUTO: 0.5 % (ref 0–0.9)
INR PPP: 1 (ref 0.9–1.1)
LYMPHOCYTES # BLD AUTO: 1.3 X10*3/UL (ref 0.8–3)
LYMPHOCYTES NFR BLD AUTO: 30.5 %
MCH RBC QN AUTO: 31.4 PG (ref 26–34)
MCHC RBC AUTO-ENTMCNC: 31.2 G/DL (ref 32–36)
MCV RBC AUTO: 101 FL (ref 80–100)
MONOCYTES # BLD AUTO: 0.29 X10*3/UL (ref 0.05–0.8)
MONOCYTES NFR BLD AUTO: 6.8 %
NEUTROPHILS # BLD AUTO: 2.5 X10*3/UL (ref 1.6–5.5)
NEUTROPHILS NFR BLD AUTO: 58.7 %
NRBC BLD-RTO: 0 /100 WBCS (ref 0–0)
PLATELET # BLD AUTO: 215 X10*3/UL (ref 150–450)
POTASSIUM SERPL-SCNC: 4.7 MMOL/L (ref 3.5–5.3)
PROT SERPL-MCNC: 7.7 G/DL (ref 6.4–8.2)
PROTHROMBIN TIME: 11.8 SECONDS (ref 9.8–12.8)
RBC # BLD AUTO: 4.52 X10*6/UL (ref 4.5–5.9)
SODIUM SERPL-SCNC: 136 MMOL/L (ref 136–145)
WBC # BLD AUTO: 4.3 X10*3/UL (ref 4.4–11.3)

## 2024-03-04 PROCEDURE — 36415 COLL VENOUS BLD VENIPUNCTURE: CPT

## 2024-03-13 ENCOUNTER — OFFICE VISIT (OUTPATIENT)
Dept: PRIMARY CARE | Facility: CLINIC | Age: 74
End: 2024-03-13
Payer: MEDICARE

## 2024-03-13 VITALS
OXYGEN SATURATION: 100 % | TEMPERATURE: 97.3 F | WEIGHT: 175.4 LBS | DIASTOLIC BLOOD PRESSURE: 62 MMHG | BODY MASS INDEX: 27.47 KG/M2 | SYSTOLIC BLOOD PRESSURE: 112 MMHG | HEART RATE: 46 BPM

## 2024-03-13 DIAGNOSIS — E83.110 HEREDITARY HEMOCHROMATOSIS (CMS-HCC): ICD-10-CM

## 2024-03-13 DIAGNOSIS — I27.20 PULMONARY HYPERTENSION (MULTI): ICD-10-CM

## 2024-03-13 DIAGNOSIS — F33.9 RECURRENT MAJOR DEPRESSIVE DISORDER, REMISSION STATUS UNSPECIFIED (CMS-HCC): ICD-10-CM

## 2024-03-13 DIAGNOSIS — I49.5 TACHY-BRADY SYNDROME (MULTI): ICD-10-CM

## 2024-03-13 DIAGNOSIS — K76.6 PORTAL HYPERTENSION (MULTI): ICD-10-CM

## 2024-03-13 DIAGNOSIS — I82.511 CHRONIC DEEP VEIN THROMBOSIS (DVT) OF FEMORAL VEIN OF RIGHT LOWER EXTREMITY (MULTI): ICD-10-CM

## 2024-03-13 DIAGNOSIS — K21.9 GASTROESOPHAGEAL REFLUX DISEASE, UNSPECIFIED WHETHER ESOPHAGITIS PRESENT: ICD-10-CM

## 2024-03-13 DIAGNOSIS — Z43.4: ICD-10-CM

## 2024-03-13 DIAGNOSIS — R25.1 TREMOR OF BOTH HANDS: Primary | ICD-10-CM

## 2024-03-13 PROCEDURE — 99214 OFFICE O/P EST MOD 30 MIN: CPT | Performed by: INTERNAL MEDICINE

## 2024-03-13 PROCEDURE — 1036F TOBACCO NON-USER: CPT | Performed by: INTERNAL MEDICINE

## 2024-03-13 PROCEDURE — 1159F MED LIST DOCD IN RCRD: CPT | Performed by: INTERNAL MEDICINE

## 2024-03-13 PROCEDURE — 1160F RVW MEDS BY RX/DR IN RCRD: CPT | Performed by: INTERNAL MEDICINE

## 2024-03-13 PROCEDURE — 3074F SYST BP LT 130 MM HG: CPT | Performed by: INTERNAL MEDICINE

## 2024-03-13 PROCEDURE — 3078F DIAST BP <80 MM HG: CPT | Performed by: INTERNAL MEDICINE

## 2024-03-13 RX ORDER — PANTOPRAZOLE SODIUM 40 MG/1
TABLET, DELAYED RELEASE ORAL
Qty: 180 TABLET | Refills: 1 | Status: SHIPPED | OUTPATIENT
Start: 2024-03-13

## 2024-03-13 ASSESSMENT — ENCOUNTER SYMPTOMS
FEVER: 0
SINUS PAIN: 0
COUGH: 0
FATIGUE: 0
SORE THROAT: 0
HEADACHES: 0
DIFFICULTY URINATING: 0
DIARRHEA: 0
ABDOMINAL PAIN: 0
PALPITATIONS: 0
DIZZINESS: 0
UNEXPECTED WEIGHT CHANGE: 0
BLOOD IN STOOL: 0
ARTHRALGIAS: 0
TREMORS: 1
BRUISES/BLEEDS EASILY: 0
WHEEZING: 0

## 2024-03-13 NOTE — PATIENT INSTRUCTIONS

## 2024-03-13 NOTE — PROGRESS NOTES
Subjective   Patient ID: William Philip is a 73 y.o. male who presents for GERD, Med Refill, and Tremors (In hands continue, gets worse with fatigue).    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 Pertinent negatives include no fatigue.   Med Refill  Pertinent negatives include no abdominal pain, arthralgias, chest pain, congestion, coughing, fatigue, fever, headaches, rash or sore throat.   Tremor         Review of Systems   Constitutional:  Negative for fatigue, fever and unexpected weight change.   HENT:  Negative for congestion, ear discharge, ear pain, mouth sores, sinus pain and sore throat.    Eyes:  Negative for visual disturbance.   Respiratory:  Negative for cough and wheezing.    Cardiovascular:  Negative for chest pain, palpitations and leg swelling.   Gastrointestinal:  Negative for abdominal pain, blood in stool and diarrhea.    Genitourinary:  Negative for difficulty urinating.   Musculoskeletal:  Negative for arthralgias.   Skin:  Negative for rash.   Neurological:  Positive for tremors. Negative for dizziness and headaches.   Hematological:  Does not bruise/bleed easily.   Psychiatric/Behavioral:  Negative for behavioral problems.    All other systems reviewed and are negative.      Objective   Lab Results   Component Value Date    HGBA1C 4.6 05/14/2021      /62   Pulse (!) 46   Temp 36.3 °C (97.3 °F)   Wt 79.6 kg (175 lb 6.4 oz)   SpO2 100%   BMI 27.47 kg/m²   Lab Results   Component Value Date    WBC 4.3 (L) 03/04/2024    HGB 14.2 03/04/2024    HCT 45.5 03/04/2024     03/04/2024    CHOL 93 01/06/2020    TRIG 62 01/06/2020    HDL 40.0 01/06/2020    ALT 15 03/04/2024    AST 25 03/04/2024     03/04/2024    K 4.7 03/04/2024     03/04/2024    CREATININE 1.10 03/04/2024    BUN 18 03/04/2024    CO2 25 03/04/2024    TSH 3.63 10/16/2023    PSA 1.20 05/02/2022    INR 1.0 03/04/2024    HGBA1C 4.6 05/14/2021     par   Physical Exam  Vitals and nursing note reviewed.   Constitutional:       Appearance: Normal appearance.   HENT:      Head: Normocephalic.      Nose: Nose normal.   Eyes:      Conjunctiva/sclera: Conjunctivae normal.      Pupils: Pupils are equal, round, and reactive to light.   Cardiovascular:      Rate and Rhythm: Regular rhythm.   Pulmonary:      Effort: Pulmonary effort is normal.      Breath sounds: Normal breath sounds.   Abdominal:      General: Abdomen is flat.      Palpations: Abdomen is soft.   Musculoskeletal:      Cervical back: Neck supple.   Skin:     General: Skin is warm.   Neurological:      General: No focal deficit present.      Mental Status: He is oriented to person, place, and time.      Cranial Nerves: No cranial nerve deficit.      Sensory: No sensory deficit.      Motor: No weakness.      Coordination: Coordination normal.      Gait: Gait normal.      Deep Tendon Reflexes:  Reflexes normal.      Comments: Hand tremors   Psychiatric:         Mood and Affect: Mood normal.         Assessment/Plan   William was seen today for gerd, med refill and tremors.  Diagnoses and all orders for this visit:  Recurrent major depressive disorder, remission status unspecified (CMS/HCC) (Primary)  Tremor of both hands  Gastroesophageal reflux disease, unspecified whether esophagitis present  -     pantoprazole (ProtoNix) 40 mg EC tablet; TAKE ONE TABLET BY MOUTH TWO TIMES A DAY, 30 MINUTES BEFORE BREAKFAST AND AT BEDTIME.  Portal hypertension (CMS/HCC)  Encounter for attention to other artificial openings of digestive tract (CMS/HCC)  Pulmonary hypertension (CMS/HCC)  Tachy-hernan syndrome (CMS/HCC)  Chronic deep vein thrombosis (DVT) of femoral vein of right lower extremity (CMS/HCC)  Hereditary hemochromatosis (CMS/HCC)  Other orders  -     Follow Up In Primary Care - Established  -     Follow Up In Primary Care - Established; Future   Patient was identified as a fall risk. Risk prevention instructions provided.

## 2024-03-27 ENCOUNTER — APPOINTMENT (OUTPATIENT)
Dept: RADIOLOGY | Facility: HOSPITAL | Age: 74
End: 2024-03-27
Payer: MEDICARE

## 2024-04-06 NOTE — PROGRESS NOTES
Subjective   Patient ID: William Philip is a 73 y.o. male who presents for Sleep Apnea.  HPI  68-year-old  man with moderate obstructive sleep apnea (AHI 22, 4/12/16) on AutoPap 8-18 cm water with medium AmaraView fullface mask presents to assess response to treatment. He is doing well with C Pap, greater than 4 hour use is 10%, using it 8 hours every night, AHI is elevated 10.5 but his mask is leaking. I adjusted his mask today, he isn't using the white clips on his straps. No snoring on C Pap. Feels rested with sleep. Denies excessive daytime sleepiness.[place] sleepiness score is 6/24. he had bariatric surgery and has already lost 140 pounds.    11/21/19; he is doing well since his last visit. He uses his CPAP every night with sleep he needs his mask adjusted today the straps are coming down and rubbing on his ears. He feels rested with sleep and receive supplies regularly    01/16/2020: Since last visit he has been doing well he has lost over 140 pounds. He continues to use his CPAP every night his AHI is elevated at 10.3 but he also has a mask leak. I adjusted his mask in the office today.     11/04/2020: He is here today for follow-up. He is doing well with his CPAP machine using it every night for 8 hours. His pressures still remain high at 17 his AHI is 4. He has lost 140 pounds. Unfortunately he had to have emergency surgery in August. Patient is recovering nicely. Does feel mostly rested with sleep and get supplies regularly.    11/4/2021: He is here today for follow-up with his CPAP. He is doing well he uses it every night for 8 hours his AHI is 7. He feels rested with sleep and get supplies regularly. Patient had some health issues this year that have now resolved and he is feeling much better.    05/05/2022 he is here today for follow-up with his CPAP. Unfortunately he has had a couple hospitalizations for small bowel obstruction and also ascites. Patient has really lost a significant amount  of weight. He still has sleep apnea I am still seeing his CPAP pressures up around 15 and his AHI is 5. He is eligible for a new CPAP machine we will order him 1 today I will order him an auto CPAP 8-16. He also needs smaller headgear he uses the AirFit F 30 I he has standard headgear not sure if it comes in a small but if it does that would fit him better.    11/9/22 he is here today for follow-up with his CPAP. He does well with his CPAP he uses it every night for 9 hours AHI is 3.7. He has a large mask leak. He needs smaller headgear and a small cushion. He also needs a new machine he has an old 3G ResMed I will order this from [facility]    11/8/23 he is here today for follow-up.  He is doing well with his CPAP.  He uses it every night for 9 hours AHI is 2.7.  His 95th percentile pressure is 13.  He uses the ResMed air fit F30 I and a medium.  He feels rested with sleep.  He gets supplies regularly.  Unfortunately he still has issues with ascites continues to see his GI doctor.      Sleep history:  Uses his CPAP every night with sleep   Denies snoring, apneas, feeling tired during the day or taking naps during the day. ESS 3     Comorbidities:  Morbid Obesity  Post bariatric surgery     SH:  smoking: Former smoker  drinking: none  illicit drug use: none     Occupation: (Full questionnaire on exposures obtained, discussed with the patient and scanned to EMR)  No known exposure to asbestos, silica or beryllium     Family History:  No family history of lung diseases or cancer     Imaging history: (I have personally reviewed the imaging below)  12/20/17 - CXR - bilateral hazy opacities     PFTs:  2016 // -> FEV1/FVC ratio/ .83 FEV1 68% (no BD response)/FVC 61%      6 MWTs: None on record     Lung biopsy: None on record     Echo: None on record     Labs:  : Hb 15.5 , Eos <250, Bicarb 29 , Creat 0.76  Review of Systems   All other systems reviewed and are negative.      Objective   Physical Exam  Vitals and nursing note  reviewed.   Constitutional:       Appearance: Normal appearance.   HENT:      Head: Normocephalic.      Nose: Nose normal.   Eyes:      Pupils: Pupils are equal, round, and reactive to light.   Pulmonary:      Effort: Pulmonary effort is normal.   Neurological:      General: No focal deficit present.      Mental Status: He is alert and oriented to person, place, and time. Mental status is at baseline.   Psychiatric:         Mood and Affect: Mood normal.         Behavior: Behavior normal.         Thought Content: Thought content normal.         Judgment: Judgment normal.         Assessment/Plan     # Obstructive sleep apnea, doing well with C Pap machine. he had bariatric surgery and has already lost 140 pounds. AHI is elevated due to mask leak. I adjusted his mask in the office today   - Continue with auto Pap 8-18 with a medium F30 FFM every night with sleep.   -Great job with his CPAP using it every night for 8 hours AHI is 7, 95th percentile pressure is 16   - Good job with your CPAP. I will order you a new CPAP with pressures 8-16 and I will see if your headgear comes in a smaller size  11/9/22 he is doing great with his CPAP using it every night for 9 hours AHI is 3.7. He also 3G machine I am going to order him a new ResMed machine. He also needs a small cushion with small headgear for his AirFit F 30i  11/8/23 He is doing great with his CPAP he uses it every night for 9 hours AHI is 2.7.  95th percentile pressure is 13.8 he uses the ResMed air fit F30 I with a medium cushion.    # Obesity, losing weight after bariatric surgery, no longer morbidly obese   - Encouraged patient to continue to lose weight with diet and exercise which will help in the long term treatment of obstructive sleep apnea.    Mr. Philip it was a pleasure seeing you in the office today. We discussed the following:      - Good use of your CPAP. Continue to use your CPAP every night with sleep   - Congratulations on your weight loss after  bariatric surgery    Follow-up in 1 year with your CPAP machine or sooner if needed         5-10 5-10

## 2024-04-09 ENCOUNTER — HOSPITAL ENCOUNTER (OUTPATIENT)
Dept: RADIOLOGY | Facility: HOSPITAL | Age: 74
Discharge: HOME | End: 2024-04-09
Payer: MEDICARE

## 2024-04-09 VITALS
OXYGEN SATURATION: 100 % | SYSTOLIC BLOOD PRESSURE: 135 MMHG | RESPIRATION RATE: 18 BRPM | HEART RATE: 54 BPM | DIASTOLIC BLOOD PRESSURE: 70 MMHG

## 2024-04-09 DIAGNOSIS — R18.8 OTHER ASCITES: ICD-10-CM

## 2024-04-09 PROCEDURE — 2720000007 HC OR 272 NO HCPCS

## 2024-04-09 PROCEDURE — 49083 ABD PARACENTESIS W/IMAGING: CPT

## 2024-04-09 PROCEDURE — 49083 ABD PARACENTESIS W/IMAGING: CPT | Performed by: RADIOLOGY

## 2024-04-10 ENCOUNTER — OFFICE VISIT (OUTPATIENT)
Dept: GASTROENTEROLOGY | Facility: CLINIC | Age: 74
End: 2024-04-10
Payer: MEDICARE

## 2024-04-10 VITALS
TEMPERATURE: 97.5 F | HEART RATE: 50 BPM | SYSTOLIC BLOOD PRESSURE: 152 MMHG | HEIGHT: 67 IN | DIASTOLIC BLOOD PRESSURE: 90 MMHG | WEIGHT: 174 LBS | BODY MASS INDEX: 27.31 KG/M2

## 2024-04-10 DIAGNOSIS — K76.6 PORTAL HYPERTENSION (MULTI): Primary | ICD-10-CM

## 2024-04-10 DIAGNOSIS — R18.8 CIRRHOSIS OF LIVER WITH ASCITES, UNSPECIFIED HEPATIC CIRRHOSIS TYPE (MULTI): ICD-10-CM

## 2024-04-10 DIAGNOSIS — K74.60 CIRRHOSIS OF LIVER WITH ASCITES, UNSPECIFIED HEPATIC CIRRHOSIS TYPE (MULTI): ICD-10-CM

## 2024-04-10 PROCEDURE — 3080F DIAST BP >= 90 MM HG: CPT | Performed by: INTERNAL MEDICINE

## 2024-04-10 PROCEDURE — 99215 OFFICE O/P EST HI 40 MIN: CPT | Performed by: INTERNAL MEDICINE

## 2024-04-10 PROCEDURE — 1160F RVW MEDS BY RX/DR IN RCRD: CPT | Performed by: INTERNAL MEDICINE

## 2024-04-10 PROCEDURE — 1159F MED LIST DOCD IN RCRD: CPT | Performed by: INTERNAL MEDICINE

## 2024-04-10 PROCEDURE — 3077F SYST BP >= 140 MM HG: CPT | Performed by: INTERNAL MEDICINE

## 2024-04-10 RX ORDER — AMILORIDE HYDROCHLORIDE 5 MG/1
10 TABLET ORAL DAILY
Qty: 60 TABLET | Refills: 3 | Status: SHIPPED | OUTPATIENT
Start: 2024-04-10 | End: 2024-08-08

## 2024-04-10 NOTE — PROGRESS NOTES
HEPATOLOGY RETURN PATIENT VISIT    April 10, 2024    Dr. Xiomara Khan       Patient Name:   KEVEN RAYMUNDO  Medical Record Number: [id number]  YOB: 1950    Dear Dr. Khan,    I had the pleasure of seeing Keven Raymundo (along with his wife and daughter) for follow-up evaluation in the [facility] ([facility] office). History and physical examination was performed. Pertinent available laboratory, imaging, pathology results were reviewed.  I spent over 20 minutes reviewing his chart in preparation for this visit.    History of Present Illness:   The patient is a 73 year old white male who is referred for follow-up evaluation of ascites and a concern for cirrhosis and possible noncirrhotic portal hypertension and compound heterozygote for hemochromatosis.    The patient has an extremely extensive past medical history as noted below.  He then developed the onset of high-protein low SAAG ascites.  He was seen by someone in the gastroenterology department and referred to me for further evaluation.  I suspect that the concern was determining whether he definitively had cirrhosis or not.    The patient had undergone surgery in January 2018 for bariatric surgery and refractory GERD and a large paraesophageal hernia.  He was found to have a large hiatal hernia with incarcerated transverse colon and greater omentum as well as a liver that grossly appeared to be cirrhotic.  He underwent laparoscopic reduction of the hernia, laparoscopic Christopher-en-Y gastric bypass, and intraoperative liver biopsy.  Interestingly, the biopsy only showed mild fibrosis.    After his Christopher-en-Y surgery, he lost a significant amount of weight.  His maximum weight was 430 pounds.  However, for the last year, he has weighed between 180 pounds and 190 pounds.  He did have some surgical issues including anastomotic ulcer and an internal hernia that required surgery.      He did not have any ascites immediately  after surgery or for the next few years.  However, starting in 4/2022, he began to have onset of ascites along with nausea and abdominal discomfort.  He underwent a CAT scan that showed large amount of ascites and possible nodularity to the liver.  He was admitted to the hospital.  He was seen by one of the gastroenterology APN's (Ameya) who recommended additional work-up and diuretics.  He underwent a 5.7 L paracentesis on 4/15/2022.  There was a concern for possible adhesions and small bowel obstruction which was treated conservatively.  There was no surgical intervention deemed necessary.  The patient was discharged 4/16/2022 on Lasix 20 mg daily and told to make an appointment with hepatology.    He was again admitted to the hospital 4/30/2022 with lower abdominal pain.  Imaging studies again showed dilated loops of small bowel and large ascites. He underwent a 2.94 L paracentesis on 5/2/2022.  He was again seen by Ms. Hou in GI who felt that he had malignant ascites.  He was again sent out on Lasix 20 mg daily. He was again seen by Ms. Hou on 5/24/2022 and was told to remain on Lasix 20 mg daily as well as Aldactone 50 mg daily.  He noticed a good increase in his urine output after being placed on those diuretics.  He is following a low-salt diet.    It was not felt that the ascites was chylous.  The patient reports that the ascites is clear and light yellow in appearance.  He was also seen by his bariatric surgery clinic who recommended that he come see us for the ascites.    The patient had risk factors for fatty liver disease including morbid obesity.  Obviously, he underwent the definitive therapy with bariatric surgery.    The patient denied any past history of acute hepatitis or jaundice.      He has never had any jaundice, hepatic encephalopathy, or variceal bleeding.     He does have significant bilateral lower extremity edema but reports that this is chronic.  He has actually had an IVC filter placed  for DVT and peripheral chronic edema in the right lower extremity because he could not tolerate Eliquis due to bleeding.    He initially saw me in consultation 5/26/2022.  At that time, I did additional evaluation (see results below).  At that time, I suggested echocardiogram, liver biopsy, low-sodium diet, continuing diuretics, and see me back in clinic after the biopsy was done.    He underwent transjugular liver biopsy 6/7/2022.  There was an IVC filter in place but no evidence of clot adherent to that.  The right atrial pressure was 5 mmHg.  The free hepatic venous pressure was 6 mmHg and the wedge pressure was 22 mmHg.  The hepatic venous pressure gradient was 16 mmHg.  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    He underwent a 5 L paracentesis on 6/3/2022.  He underwent a 2.1 L paracentesis on 6/27/2022.    He was seen in the emergency room 7/25/2022 for right lower quadrant pain of 1 day duration.  This was a new pain for him.  It was worse when he laid still and better when he moved around.  He was given pain medication with improvement.  He was also found to have a kidney stone on the ER CAT scan.  He was also found to be positive for COVID-19.    He underwent a 4 L paracentesis on 8/1/2022.  He underwent a 4.8 L paracentesis on 8/30/2022.    He was seen by his cardiologist who removed his IVC filter in early September 2022.  He thinks that his peripheral edema may be slightly better since then.    He underwent diagnostic laparoscopy with laparoscopic peritoneal biopsy, laparoscopic core needle biopsy, and paracentesis with Dr. Parikh on 10/3/2022.  The peritoneum showed some exudate.  The biopsy just showed nonspecific inflammation.  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    When I saw him in clinic  in November 2022, he was building up some fluid.  I adjusted his diuretics to Lasix 40 mg daily and Aldactone 50 mg daily.    He has been having episodes of dizziness and has been seen in the emergency room where he was found to be orthostatic.  He was also seen by neurology.  At some point, it was felt that his diuretics were contributing to his symptoms and his furosemide was stopped.  He was just maintained on spironolactone 50 mg daily at that point.    He continues to get paracentesis about every 4 to 6 weeks.  He underwent a 5.1 L paracentesis on 11/15/2022.  He underwent a 5.5 L paracentesis on 1/11/2023.  He underwent a 3.5 L paracentesis on 2/27/2023.  He underwent a 4.9 L paracentesis on 4/5/2023.    When I saw him in clinic on 5/10/2023, I increased his spironolactone to 75 mg daily.    He underwent a 3 L paracentesis on 7/6/2023.  He underwent a 3.4 L paracentesis on 8/23/2023.  He underwent a 3.5 L paracentesis on 10/3/2023.    He continues to get paracentesis approximately every 2 to 3 months.  He is now taking spironolactone 100 mg daily, but no furosemide.    He underwent a 1.8 L paracentesis on 12/18/2023.  He underwent a 1.7 L paracentesis on 1/29/2024. He underwent a 1 L paracentesis on 4/9/2024.  He had an appointment on 4/9/2024 which is why he had the paracentesis, even though he did not have much ascites.  He    He presented today for follow-up evaluation.  He denied any specific liver-related complaints.  As noted above, he is on spironolactone 100 mg daily but no furosemide.  He is no longer having the orthostatic symptoms.  He is trying to follow a low-sodium diet.  He is trying to avoid artificial salt substitutes.  He also eats one banana a day.  He denied any GI bleeding.  He does have quite significant fatigue.  He has also been having anxiety.  He also reports some breast tenderness.    Past Medical/Surgical History:   ? Obstructive sleep apnea (327.23) (G47.33)  ? Obesity (BMI  30-39.9) (278.00) (E66.9)  ? Abdominal distention (787.3) (R14.0)  ? Actinic keratosis (702.0) (L57.0)  ? Bariatric surgery status (V45.86) (Z98.84)  ? Benign essential HTN (401.1) (I10)  ? Benign essential hypertension (401.1) (I10)  ? Chronic deep vein thrombosis (DVT) of femoral vein of right lower extremity (453.51)  ? Chronic fatigue (780.79) (R53.82)  ? Class 1 obesity with body mass index (BMI) of 30.0 to 30.9 in adult (278.00,V85.30)  ? Dermatophytosis of groin and perianal area (110.3) (B35.6)  ? DVT prophylaxis (V07.9)  ? Eczema of both external ears (380.22) (H60.543)  ? Elevated triglycerides with high cholesterol (272.2) (E78.2)  ? Encounter for immunization (V03.89) (Z23)  ? Encounter for Medicare annual wellness exam (V70.0) (Z00.00)  ? Encounter for prostate cancer screening (V76.44) (Z12.5)  ? Gastroesophageal reflux disease (530.81) (K21.9)  ? Gastrointestinal bleed (578.9) (K92.2)  ? H/O bariatric surgery (V45.86) (Z98.84)  ? H/O vasomotor rhinitis (V12.69) (Z87.09)  ? Hepatic cirrhosis, unspecified hepatic cirrhosis type, unspecified whether ascites present  ? Hernia, hiatal (553.3) (K44.9)  ? History of elevated glucose (V12.29) (Z86.39)  ? Hypertension, uncontrolled (401.9) (I10)  ? Inability to attain erection (302.72) (N52.9)  ? Intra-abdominal infection (136.9) (B99.9)  ? Low serum iron (280.9) (E61.1)  ? Lymphedema (457.1) (I89.0)  ? Male erectile disorder (607.84) (N52.9)  ? Malnutrition following gastrointestinal surgery (579.3) (K91.2)  ? Medicare annual wellness visit, subsequent (V70.0) (Z00.00)  ? Near syncope (780.2) (R55)  ? Nodule of skin of chest (782.2) (R22.2)  ? Obesity (BMI 30-39.9) (278.00) (E66.9)  ? Obstructive sleep apnea (327.23) (G47.33)  ? Open wound of right lower extremity, sequela (906.1) (S81.801S)  ? Other long term (current) drug therapy (V58.69) (Z79.899)  ? Post-operative pain (338.18) (G89.18)  ? Preop cardiovascular exam (V72.81) (Z01.810)  ? Preoperative  clearance (V72.84) (Z01.818)  ? PUD (peptic ulcer disease) (533.90) (K27.9)  ? Pulmonary hypertension (416.8) (I27.20)  ? Refractory ascites (789.59) (R18.8)  ? S/P insertion of inferior vena caval filter (V45.89) (Z95.828)  ? Screen for colon cancer (V76.51) (Z12.11)  ? Screening for colon cancer (V76.51) (Z12.11)  ? Skin growth (239.2) (D49.2)  ? Skin irritation (709.9) (R23.8)  ? Small bowel obstruction (560.9) (K56.609)  ? SOB (shortness of breath) on exertion (786.05) (R06.02)  ? Spasm of abdominal muscles (728.85) (M62.838)  ? Swelling of both lower extremities (729.81) (M79.89)  ? 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 Tetanus, diphtheria, and acellular pertussis (Tdap) vaccination declined (V64.06)  ? Ulcer, anastomotic (534.90) (K28.9)  ? Urinary incontinence (788.30) (R32)  ? Venous stasis ulcer (454.0) (I83.009,L97.909)  ? Vitamin B12 deficiency (266.2) (E53.8)  ? Vitamin D deficiency (268.9) (E55.9)  ? Wound of right lower extremity, initial encounter (894.0) (S81.801A)  ? History of Abnormal glucose level (790.29) (R73.09)  ? Resolved Date: 11 Nov 2021  ? History of Acute deep vein thrombosis (DVT) of femoral vein of right lower extremity  ? Resolved Date: 11 Nov 2021  ? History of Cellulitis (682.9) (L03.90)  ? Resolved Date: 08 Nov 2016  ? History of Cellulitis of right upper extremity (682.3) (L03.113)  ? Resolved Date: 08 Nov 2016  ? History of Chronic marginal ulcer (534.70) (K28.7)  ? Resolved Date: 11 Nov 2021  ? History of Class 1 obesity with serious comorbidity in adult (278.00) (E66.9)  ? Resolved Date: 11 Nov 2021  ? History of Contusion of left lower leg, initial encounter (924.10) (S80.12XA)  ? Resolved Date: 11 Nov 2021  ? History of Contusion of right lower leg, subsequent encounter (V58.89,924.10)  ? Resolved Date: 11 Nov 2021  ? History of Dry skin dermatitis (692.89) (L85.3)  ? Resolved Date: 22 Apr 2016  ? History of abdominal pain (V13.89) (Z87.898)  ? Resolved Date: 11 Nov    ? History of acute sinusitis (V12.69) (Z87.09)  ? Resolved Date: 03 Dec 2018  ? History of acute sinusitis (V12.69) (Z87.09)  ? Resolved Date: 2021  ? History of anxiety (V11.8) (Z86.59)  ? Resolved Date: 2021  ? History of contact dermatitis (V13.3) (Z87.2)  ? Resolved Date: 2021  ? History of dermatitis (V13.3) (Z87.2)  ? Resolved Date: 2021  ? History of dermatitis (V13.3) (Z87.2)  ? Resolved Date: 2020  ? History of lump of right breast (V13.89) (Z87.898)  ? Resolved Date: 2021  ? History of sinusitis (V12.69) (Z87.09)  ? Resolved Date: 2016  ? History of Morbid obesity with body mass index of 60.0-69.9 in adult (278.01,V85.44)  (E66.01,Z68.44)  ? Resolved Date: 2020  ? History of Poison ivy (692.6) (L23.7)  ? Resolved Date: 17 Mar 2015  ? History of Skin growth (239.2) (D49.2)  ? Resolved Date: 2016  ? History of Small bowel obstruction, partial (560.9) (K56.600)  ? Resolved Date: 2021  ? History of Choledochectomy  ? History of Exploratory laparoscopy  ? 2020 - perforated marginal ulcer - Paul patch repair - Surgeon: Axel Simpson MD - [facility] - [phone]  ? History of Exploratory laparotomy  ? 2020 - 2020 - intestinal obstruction, adhesiolysis, laparoscopy converted to  open laparotomy - Surgeon: Axel Simpson MD - [facility]  ? History of Knee Arthroplasty  ? History of Neuroplasty Decompression Median Nerve At Carpal Tunnel  ? History of Repair Of Paraesophageal Hiatus Hernia  ? History of Christopher-en-Y gastric bypass  ? 2018 w/Dr. Mccormick  ? History of Vascular surgical procedure  ? Right IVC  COVID-19    Family History:   ? Family history of Lung Cancer (V16.1)  ? Family history of Thyroid Cancer  ? Family history of Diabetes Mellitus (V18.0)  ? Family history of Obesity  ? Family history of Stroke Syndrome (V17.1)  ? Family history of Alcohol Abuse  Mother  of lung  cancer.  Father  of prostate cancer.  There is no known family history of liver disease.    Social History:   He is a former smoker.  He is .  He is retired.  He is a social drinker, but none recently.  He denied tattoos.  He denied blood transfusions.  He denied intravenous drug use.    Review of systems: As noted above.  He has anxiety.  He has the breast tenderness.  He does have chronic peripheral edema, right greater than left.  It might be slightly better since having the IVC filter removed.  He does have fatigue.  He does have a low heart rate at times.  He does get nausea and abdominal pain when the ascites is large, but not currently.  He does have dry itchy skin.  He does have issues with anxiety.  He does have weakness.  He has easy bruising.  He has erectile dysfunction.  His weight has been stable.  No fever, chills, weight loss, visual changes, auditory changes, shortness of breath, chest pain, GI bleeding, diarrhea, constipation, depression, dysuria, hematuria, musculoskeletal issues, or rash.       Medical, Surgical, Family, and Social Histories  Past Medical History:   Diagnosis Date    Acute embolism and thrombosis of right femoral vein (CMS/[facility]) 07/15/2021    Acute deep vein thrombosis (DVT) of femoral vein of right lower extremity    Allergic contact dermatitis due to plants, except food 2014    Poison ivy    Cellulitis of right upper limb 2016    Cellulitis of right upper extremity    Cellulitis, unspecified 2016    Cellulitis    Chronic gastrojejunal ulcer without hemorrhage or perforation 2021    Chronic marginal ulcer    Contusion of left lower leg, initial encounter 2019    Contusion of left lower leg, initial encounter    Contusion of right lower leg, subsequent encounter 2019    Contusion of right lower leg, subsequent encounter    Morbid (severe) obesity due to excess calories (CMS/[facility]) 2019    Morbid obesity with body mass index of  60.0-69.9 in adult    Neoplasm of unspecified behavior of bone, soft tissue, and skin 08/08/2016    Skin growth    Obesity, unspecified 04/20/2021    Class 1 obesity with serious comorbidity in adult    Other abnormal glucose     Abnormal glucose level    Partial intestinal obstruction, unspecified as to cause (CMS/HCC) 02/01/2021    Small bowel obstruction, partial    Personal history of diseases of the skin and subcutaneous tissue 01/09/2020    History of dermatitis    Personal history of diseases of the skin and subcutaneous tissue 01/09/2020    History of dermatitis    Personal history of diseases of the skin and subcutaneous tissue 10/07/2020    History of contact dermatitis    Personal history of other diseases of the respiratory system 12/03/2018    History of acute sinusitis    Personal history of other diseases of the respiratory system 12/04/2017    History of acute sinusitis    Personal history of other diseases of the respiratory system 11/23/2015    History of sinusitis    Personal history of other mental and behavioral disorders 10/12/2021    History of anxiety    Personal history of other specified conditions 04/06/2021    History of abdominal pain    Personal history of other specified conditions 05/06/2020    History of lump of right breast    Xerosis cutis 01/22/2016    Dry skin dermatitis       Past Surgical History:   Procedure Laterality Date    CARPAL TUNNEL RELEASE  04/09/2013    Neuroplasty Decompression Median Nerve At Carpal Tunnel    IR VENOGRAM HEPATIC  6/7/2022    IR VENOGRAM HEPATIC 6/7/2022 [facility]    KNEE ARTHROPLASTY  04/09/2013    Knee Arthroplasty    MR HEAD ANGIO WO IV CONTRAST  1/6/2020    MR HEAD ANGIO WO IV CONTRAST 1/6/2020 [facility]    MR NECK ANGIO WO IV CONTRAST  1/6/2020    MR NECK ANGIO WO IV CONTRAST 1/6/2020 [facility]    OTHER SURGICAL HISTORY  04/09/2013    Choledochectomy    OTHER SURGICAL HISTORY  02/01/2021    Christopher-en-Y gastric bypass     OTHER SURGICAL HISTORY  02/01/2021    Repair Of Paraesophageal Hiatus Hernia    OTHER SURGICAL HISTORY  12/02/2020    Exploratory laparotomy    OTHER SURGICAL HISTORY  10/07/2022    Exploratory laparoscopy    OTHER SURGICAL HISTORY  05/11/2021    Vascular surgical procedure    US GUIDED ABDOMINAL PARACENTESIS  4/15/2022    US GUIDED ABDOMINAL PARACENTESIS 4/15/2022 [facility]    US GUIDED ABDOMINAL PARACENTESIS  5/2/2022    US GUIDED ABDOMINAL PARACENTESIS 5/2/2022 [facility]    US GUIDED ABDOMINAL PARACENTESIS  6/3/2022    US GUIDED ABDOMINAL PARACENTESIS 6/3/2022 [facility]    US GUIDED ABDOMINAL PARACENTESIS  6/27/2022    US GUIDED ABDOMINAL PARACENTESIS 6/27/2022 [facility]    US GUIDED ABDOMINAL PARACENTESIS  1/11/2023    US GUIDED ABDOMINAL PARACENTESIS 1/11/2023 [facility]    US GUIDED ABDOMINAL PARACENTESIS  2/27/2023    US GUIDED ABDOMINAL PARACENTESIS [facility]    US GUIDED ABDOMINAL PARACENTESIS  8/1/2022    US GUIDED ABDOMINAL PARACENTESIS 8/1/2022 [facility]    US GUIDED ABDOMINAL PARACENTESIS  8/30/2022    US GUIDED ABDOMINAL PARACENTESIS 8/30/2022 [facility]    US GUIDED ABDOMINAL PARACENTESIS  11/15/2022    US GUIDED ABDOMINAL PARACENTESIS 11/15/2022 [facility]    US GUIDED ABDOMINAL PARACENTESIS  4/5/2023    US GUIDED ABDOMINAL PARACENTESIS [facility]    US GUIDED ABDOMINAL PARACENTESIS  7/6/2023    US GUIDED ABDOMINAL PARACENTESIS 7/6/2023 [facility]    US GUIDED ABDOMINAL PARACENTESIS  8/23/2023    US GUIDED ABDOMINAL PARACENTESIS 8/23/2023 [facility]    US GUIDED ABDOMINAL PARACENTESIS  5/23/2023    US GUIDED ABDOMINAL PARACENTESIS [facility]    US GUIDED ABDOMINAL PARACENTESIS  10/3/2023    US GUIDED ABDOMINAL PARACENTESIS 10/3/2023 [facility]    US GUIDED ABDOMINAL PARACENTESIS  11/13/2023    US GUIDED ABDOMINAL PARACENTESIS 11/13/2023 [facility]    US GUIDED ABDOMINAL PARACENTESIS  12/18/2023    US GUIDED ABDOMINAL PARACENTESIS 12/18/2023 Ganga Millard MD  "[facility]       No family history on file.    Social History     Socioeconomic History    Marital status:      Spouse name: Not on file    Number of children: Not on file    Years of education: Not on file    Highest education level: Not on file   Occupational History    Not on file   Tobacco Use    Smoking status: Former     Current packs/day: 0.00     Types: Cigarettes     Quit date:      Years since quittin.2    Smokeless tobacco: Never   Substance and Sexual Activity    Alcohol use: Not Currently    Drug use: Never    Sexual activity: Not on file   Other Topics Concern    Not on file   Social History Narrative    Not on file     Social Determinants of Health     Financial Resource Strain: Not on file   Food Insecurity: Not on file   Transportation Needs: Not on file   Physical Activity: Not on file   Stress: Not on file   Social Connections: Not on file   Intimate Partner Violence: Not on file   Housing Stability: Not on file       Allergies and Current Medications  No Known Allergies  Current Outpatient Medications   Medication Sig Dispense Refill    buPROPion (Wellbutrin) 75 mg tablet Take 1 tablet (75 mg) by mouth 2 times a day. 60 tablet 5    CALCIUM CARBONATE-VITAMIN D3 ORAL Take by mouth.      cyanocobalamin, vitamin B-12, 5,000 mcg tablet, sublingual Place under the tongue once daily.      furosemide (Lasix) 20 mg tablet Take 1 tablet (20 mg) by mouth once daily. 30 tablet 11    pantoprazole (ProtoNix) 40 mg EC tablet TAKE ONE TABLET BY MOUTH TWO TIMES A DAY, 30 MINUTES BEFORE BREAKFAST AND AT BEDTIME. 180 tablet 1    pediatric multivitamin (Flintstones Multivitamin) tablet,chewable Chew 1 tablet once daily.      spironolactone (Aldactone) 25 mg tablet Take 4 tablets (100 mg) by mouth once daily. 120 tablet 11     No current facility-administered medications for this visit.        Physical Exam  /90   Pulse 50   Temp 36.4 °C (97.5 °F)   Ht 1.702 m (5' 7\")   Wt 78.9 kg (174 lb)   " BMI 27.25 kg/m²       Physical Examination:   General Appearance: alert and in no acute distress.  He does have significant bitemporal wasting.  HEENT: oropharynx without lesions. Anicteric sclerae.   Neck supple, nontender, without adenopathy, thyromegaly, or JVD.   Lungs clear to auscultation and percussion.   Heart RRR without murmurs, rubs, or gallops.   Abdomen: Soft, nontender, bowel sounds positive, with minimal ascites. Liver and spleen not palpable.  He does have scars from previous surgeries.  Extremities full ROM, no atrophy, normal strength.  He has 2+ right lower extremity edema with chronic venous stasis changes.  He has 2+ left lower extremity edema with chronic venous stasis changes.  Skin no specific lesions.   Neurological exam nonfocal, alert and oriented.  No asterixis.  He does have a tremor in both hands.  He is slow to answer.  He does have a few spider angiomata, as well as mild bilateral palmar erythema.  Chest: Mild gynecomastia bilaterally with minimal tenderness.    Labs 3/4/2024 INR 1, protein 7.7, albumin 4.2, alk phos 72, bilirubin 0.6, AST 25, ALT 15, WBC 4.3, hemoglobin 14.2, , platelets 215, glucose 78, sodium 136, creatinine 1.11.    Labs 11/14/2023 hepatitis B surface antibody negative, ferritin 338, AFP < 4.    Labs 10/16/2023 TSH 3.63.    Labs 9/11/2023 glucose 78, sodium 138, creatinine 0.89, protein 6.2, albumin 3.2, alk phos 65, bilirubin 0.4, AST 17, ALT 11, WBC 6.5, hemoglobin 13.1, platelets 218, INR 1.1.    Labs 5/10/2023 AFP < 4.    Labs 2/11/2023 WBC 4.8, hemoglobin 14.1, platelets 232, INR 1.1, ferritin 425, iron saturation 44%, glucose 56, sodium 137, potassium 3.9, creatinine 0.91, protein 7.1, albumin 3.6, alk phos 88, bilirubin 0.5, AST 20, ALT 12.    Labs 1/3/2023 ammonia 28.    Labs 10/21/2022 WBC 3.9, hemoglobin 14.1, , platelets 61, INR 1.2, glucose 62, sodium 137, potassium 4.4, creatinine 0.77, protein 6.1, albumin 3.1, alk phos 63, bilirubin  0.5, AST 20, ALT 9.    Labs 10/6/2022 platelets 200.    Ascites 10/3/2022 fungal culture negative so far, culture negative, fungal culture negative so far    Labs 9/9/2022 genetic hemochromatosis test reveals [id number] compound heterozygote, TSH 3.57.    Labs 8/26/2022 WBC 5.4, hemoglobin 12.7, , platelets 236, glucose 75, sodium 138, creatinine 0.69.    Labs 7/25/2022 lactate 0.9, WBC 7, hemoglobin 13.3, platelets 235, lipase 9, INR 1, troponin negative, glucose 88, sodium 135, creatinine 0.88, protein 6.9, albumin 3.7, alk phos 58, bilirubin 0.7, AST 15, ALT 7, COVID-19 PCR positive.    Labs 6/4/2022 WBC 5.4, hemoglobin 14.7, , platelets 216, INR 1.1, protein 6.5, albumin 3.5, alk phos 54, bilirubin 0.8, AST 13, ALT 6, glucose 97, sodium 139, potassium 4, creatinine 0.83.    Labs 5/27/2022 T spot negative.    Labs 5/3/2022 WBC 4.6, Hgb 12.6, platelets 200, INR 1.1, glucose 81, sodium 139, creat 0.79, protein 5.8, albumin 3, alk phos 48, bili 0.5, AST 14, ALT 7.    Ascites 5/2/2022 protein 4.6, glucose 90, cytology negative for malignancy, albumin 2.5, AFB culture negative, fungal culture negative, standard bacterial culture negative.    Labs 4/15/2022 ferritin 314, iron sat 25%, AFP < 4, HBsAg -, HCV -, BLANK -, ceruloplasmin 25, AMA -, SMA -, A1AT phenotype MM, HCV-RNA negative.    Ascites 4/15/2022 cytology negative for malignancy, albumin 2.5.    Labs 2/21/2021 HBsAb -, HAV +.    Ultrasound 11/24/2023:  IMPRESSION:  Cirrhotic liver morphology without focal lesion. Mild ascites.      CAT scan with contrast 7/25/2022:  IMPRESSION:  1. 4 mm obstructing calculus in the right distal ureter with minimal  hydronephrosis.  2. Extensive ascites.  3. Possible cirrhosis.  4. Prostatomegaly.    Echocardiogram 6/1/2022:  CONCLUSIONS:  1. The left ventricular systolic function is normal with a 60-65% estimated ejection fraction.  2. Spectral Doppler shows an impaired relaxation pattern of left ventricular  diastolic filling.  3. Slightly elevated RVSP.    Upper GI with small bowel follow-through 5/2/2022:  IMPRESSION:  1. Extensive postoperative changes related to gastric bypass surgery.  2. Irregularity of the distal esophagus wall concerning for  stricture. Clinical correlation recommended. Further evaluation with  endoscopy can be performed as clinically warranted to evaluate for  mucosal abnormality.  3. No signs of bowel obstruction or free air.  4. Additional nonacute findings as detailed.    CAT scan with contrast 5/1/2022:  IMPRESSION:  Surgical changes of gastric bypass with hiatal hernia. There are  dilated loops of small bowel throughout the abdomen pelvis with  decompressed loops of small bowel in the lower pelvis. Findings are  most compatible with small bowel obstruction with suspected  transition point in the left mid to lower abdomen.  Large volume ascites.  Nonobstructing stones in the kidneys bilaterally measuring up to 3 mm  on the right and 2 mm on the left.  Enlarged prostate measures up to 5.6 x 4.8 cm and abuts the posterior  bladder wall. Recommend correlation with PSA values.  The liver appeared normal.    Doppler US 4/16/2022:  IMPRESSION:  Heterogeneity of the liver with surface nodularity suspicious for  cirrhosis.  Partially imaged small to moderate volume ascites.  Major hepatic vasculature appear patent with normal directional flow.    CAT scan with contrast 4/14/2022:  IMPRESSION:  1. Unusual configuration of the bowel with multiple pathologically  distended loops of small bowel with paucity of bowel loops within the  right abdomen, midline cecum, and majority of small bowel loops  within the midline or left-sided the abdomen. Constellation of  findings is concerning for small bowel obstruction with possible  internal hernia.  2. Massive intra-abdominal and pelvic ascites.  3. Very subtle nodularity the liver which may indicate cirrhotic  morphology.    CAT scan with contrast 2/20/2021  revealed a small amount of free fluid in the abdomen, hepatomegaly.    CAT scan with contrast 11/17/2020 showed a normal liver and no evidence of free fluid.    Echocardiogram 1/6/2020:  CONCLUSIONS:  1. The left ventricular systolic function is normal with a 65-70% estimated ejection fraction.  2. Spectral Doppler shows an impaired relaxation pattern of left ventricular diastolic filling.    EGD 3/5/2021:  Impression: - Normal upper third of esophagus, middle third of  esophagus and lower third of esophagus.  - Z-line regular, 35 cm from the incisors.  - 4 cm hiatal hernia.  - Gastric bypass with a pouch 6 cm in length and  intact staple line. Gastrojejunal anastomosis  characterized by an intact appearance and ulceration.  - No specimens collected.    Liver biopsy and peritoneal biopsy 10/3/2022:  FINAL DIAGNOSIS  A. PERITONEAL BIOPSY:  -- FIBROUS TISSUE WITH CHRONIC INFLAMMATION.  B. LIVER BIOPSY:  -- LIVER TISSUE WITH MILD SINUSOIDAL DILATATION AND VAGUE NODULAR PATTERN, SEE  NOTE.  -- NO EVIDENCE OF MALIGNANCY.  Note: The majority of the biopsy shows mild sinusoidal dilatation, areas of  vague nodular pattern, and portal triads with mild inflammation and without  interface activity. 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 Although a small fragment of liver tissue shows  bridging fibrosis, it can also represent a subcapsular area with increased  fibrosis. No significant steatosis or hepatocyte injury is identified.  Overall, no definitive feature of cirrhosis is seen. A noncirrhotic portal  hypertension etiology should be included in the differential diagnosis.  Iron stain reveals mild deposition in the hepatocytes. PAS/D stain does not  reveal intracytoplasmic globules. Trichrome highlights focal bridging fibrosis.  Reticulin shows areas of compression of hepatic cords.      Liver Biopsy 6/1/2022:  FINAL DIAGNOSIS  A. LIVER:  -- MILD PORTAL AND PERIPORTAL FIBROSIS  "WITHOUT EVIDENCE OF BRIDGING FIBROSIS OR  CIRRHOSIS; SEE COMMENT.  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  -- ZONE 3 SINUSOIDAL DILATATION AND CONGESTION.  -- MODERATE IRON DEPOSITION IN AT LEAST 50% OF HEPATOCYTES.  COMMENT: The biopsy shows portal tracts with mild chronic inflammation,  consisting predominantly of lymphocytes with lesser amounts of plasma cells and  eosinophils. A non-specific mild bile ductular reaction is present. Bile  ducts appear intact without evidence of damage or inflammation. There is no  evidence of interface hepatitis or lobular inflammation. Steatosis is minimal  (less than 5%) and there is no evidence of ballooning degeneration or  Kenyetta-Denk bodies to suggest steatohepatitis.  A trichrome stains shows portal and periportal fibrosis with mild zone 3  pericellular fibrosis (fibrosis stage = 1-2 of 4, Eliza-Alexander). Bridging  fibrosis or cirrhosis is not evident. A reticulin stain highlights zone 3  hepatocellular atrophy and a vague \"nodular\" appearance, compatible with an  \"NRH\"-like pattern of injury. An iron stain shows moderate iron deposition in  zone 3 regions, with prominent granular deposition in periportal hepatocytes  (approximately 50% overall). A PAS-D stain for alpha-1-antitrypsin globules is  negative.  Overall, the histopathologic features are not specific as to a single  diagnostic entity but are compatible with so-called \"non-cirrhotic portal  hypertension\" (note is made of the clinical presence of portal hypertension  during transjugular liver biopsy). The etiologies for non-cirrhotic portal  hypertension are numerous and require clinical correlation. In addition, given  the presence of increased iron deposition, exclusion of hereditary  hemochromatosis is necessary.    Liver biopsy 1/30/2018:  A. LIVER, CORE BIOPSY:  -- LIMITED HEPATIC PARENCHYMA WITH MILD PORTAL TRACT MIXED CHRONIC  INFLAMMATION, FOCAL MILD INTERFACE ACTIVITY AND FOCUS " OF LOBULAR ACTIVITY.  -- PORTAL FIBROSIS, NO EVIDENCE OF BRIDGING FIBROSIS OR CIRRHOSIS.  -- MILD IRON ACCUMULATION IN APPROXIMATELY 50% OF HEPATOCYTES.  -- MILD STEATOSIS, APPROXIMATELY 10%.  -- SEE COMMENT.  COMMENT: Interpretation of the biopsy is limited by small sample size and  limited number of portal tracts (7) available for review. There is mild portal  tract chronic inflammation consisting mostly of lymphocytes with occasional  plasma cells. There is minimal interface activity and rare foci of lobular  inflammation. There is no evidence of cholestasis, ballooning degeneration, or  Kenyetta-Denk bodies. Steatosis is macrovesicular and approximately 10%. There  is focal bile duct proliferation in larger portal tracts, which is  non-specific. There is focal portal tract fibrosis but no evidence of bridging  fibrosis in this limited biopsy (Eliza-Alexander, Stage 1). There is mild iron  accumulation in the majority of hepatocytes, with accentuation in a zone 1  distribution. While non-specific, genetic testing for hereditary  hemochromatosis should be considered, if clinically indicated. Finally, there  is no evidence of abnormal intracytoplasmic accumulation of  alpha-1-antitrypsin. Iron, trichrome, reticulin and PAS-D stains have been  reviewed. Clinical and serologic correlation is recommended.          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    He obviously has a very complicated past history with multiple surgeries and complications from his bariatric surgery in 2018.    Interestingly, during his surgery in 2018 the surgeon noted that his liver looked grossly cirrhotic.  However, his liver biopsy  intraoperatively in January 2018 only showed mild scar tissue.  This is obviously a huge discrepancy.  If he really did only have mild fibrosis, it would be very unlikely that he has progressed to cirrhosis.  On the other hand, if he did have cirrhosis, he obviously could have progressed over time.  Obviously, his main risk factor would be AWAD cirrhosis, but he has undergone the definitive treatment of bariatric surgery with weight loss.    Also confusing is the fact that his different imaging studies have shown a normal liver ranging all the way to cirrhosis.  Also, except for a low albumin, his liver tests and platelets are perfectly normal.    Of note, his ascites fluid is unusual and that it has a very high protein and a very low serum ascites-albumin gradient (SAAG).  The differential for this would usually include malignancy, TB, or fungal infections.  His cytology was negative twice.  His T spot test was negative.  His ascites from 5/2/2022 was checked for TB and fungus.  Those tests were negative.  Echocardiogram and transjugular liver biopsy with pressures did not show significant evidence of heart failure.  However, the biopsy did show elevated hepatic venous pressure gradient of 16 mmHg.    His TSH was normal.    Given all of these discrepancies, we did repeat a transjugular liver biopsy in June 2022.  As noted above, he did not have cirrhosis or advanced fibrosis.  However, he did have portal hypertension based on pressure measurements.  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    It is possible that he has a multifactorial ascites.  However, there was no evidence of malignancy, infection, or heart failure.  Nodular regenerative hyperplasia can be caused by medications (such as chemotherapy which he is not on), neoplasia, hematologic conditions, infections, autoimmune conditions, or can be idiopathic.  Again, before  we assume this diagnosis, the question was whether we should consider diagnostic laparoscopy.  Given the very unusual characteristics of his ascites with the very high protein and low SAAG, I favored a diagnostic laparoscopy.     As noted above, he underwent laparoscopy 10/3/2022 which did show some exudate on the peritoneum, and a soft liver.  The biopsy showed focal stage 3 fibrosis.  I did speak with the surgeon via email on 11/8/2022.  He felt that the exudate was likely some leftover mild inflammation but would not be enough to explain the ascites.  He also did not feel that the patient had cirrhosis grossly to explain ascites.    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  The exact etiology of the nodular regenerative hyperplasia is not clear in his case.  For now, I would try to treat him with diuretics and low-sodium diet.  He seems to be doing reasonably well with his spironolactone 100 mg daily. Luckily, he is only getting low volume paracenteses every 2-3 months now.    With the gynecomastia and tenderness, I will switch him to amiloride 10 mg daily.    If he eventually fails diuretics, we can consider treatment such as TIPS even in a patient with noncirrhotic portal hypertension and nodular regenerative hyperplasia.  However, I would be very concerned about the potential for encephalopathy from TIPS.  For that reason, I would hold off on it for now.  I also mentioned liver transplant to them.  With his relatively normal LFTs, his MELD score would be very low and he would not get a transplant currently anyway.  In addition, with his advanced age and other medical conditions, I doubt that he would be a good candidate anyway.    He can get AFP and ultrasound every 6 months.    He is immune to hepatitis A.    He is not immune to hepatitis B, despite vaccinations.  I  will check a hepatitis B core antibody with his next labs.    At the recommendation of the pathologist based on increased iron on his liver biopsy, I did recommend a genetic hemochromatosis test.  He is a compound heterozygote for hemochromatosis.  However, he only has mild iron in his biopsy.  Also, even though his ferritin is mildly elevated, his iron saturation is low normal.  For these reasons, I would not recommend phlebotomy at this time.  We will continue to check iron panel and ferritin every 6 months.  The latest values again showed no significant elevations that would warrant phlebotomy.      Otherwise, I will have him get labs every 3 months and see him back in about 6 months.  Depending on his overall course when I see him back in about 6 months, we can discuss other options such as of the TIPS.    He is due for EGD to screen for varices.    He can follow-up with Rachel PRASAD for his GI needs.    Thank you for allowing me to participate in the care of this patient. Please feel free to contact me with any questions regarding their care.     Sincerely,     Riley Gaspar MD, FAASLD, FACG.   Medical Director, Hepatology  [facility]  [facility]  Professor of Medicine  Division of Gastroenterology and Liver Disease  [employer] [employer]  [address] [place] [id number]  Phone: [phone]  Fax: [phone].      Cc: Dr. Da Silva  Cc: SHANICE Negrete (GI)      This document was generated with a computerized dictation system.  Because of this, there could be errors in grammar and/or content.

## 2024-04-10 NOTE — PATIENT INSTRUCTIONS
Get labs in 2 months and in 5 months.    Get an ultrasound in about 2 months.    We will order amiloride 10 mg daily.  Once you receive this, you can start taking this and stop taking the spironolactone.    Get upper endoscopy.    See me back in clinic in about 6 months.

## 2024-05-08 ENCOUNTER — HOSPITAL ENCOUNTER (OUTPATIENT)
Dept: GASTROENTEROLOGY | Facility: HOSPITAL | Age: 74
Discharge: HOME | End: 2024-05-08
Payer: MEDICARE

## 2024-05-08 VITALS
TEMPERATURE: 97.5 F | HEART RATE: 49 BPM | RESPIRATION RATE: 16 BRPM | SYSTOLIC BLOOD PRESSURE: 106 MMHG | WEIGHT: 179.9 LBS | DIASTOLIC BLOOD PRESSURE: 55 MMHG | OXYGEN SATURATION: 97 % | BODY MASS INDEX: 28.24 KG/M2 | HEIGHT: 67 IN

## 2024-05-08 DIAGNOSIS — K76.6 PORTAL HYPERTENSION (MULTI): Primary | ICD-10-CM

## 2024-05-08 DIAGNOSIS — R18.8 CIRRHOSIS OF LIVER WITH ASCITES, UNSPECIFIED HEPATIC CIRRHOSIS TYPE (MULTI): ICD-10-CM

## 2024-05-08 DIAGNOSIS — K74.60 CIRRHOSIS OF LIVER WITH ASCITES, UNSPECIFIED HEPATIC CIRRHOSIS TYPE (MULTI): ICD-10-CM

## 2024-05-08 PROCEDURE — 7100000010 HC PHASE TWO TIME - EACH INCREMENTAL 1 MINUTE

## 2024-05-08 PROCEDURE — 2500000004 HC RX 250 GENERAL PHARMACY W/ HCPCS (ALT 636 FOR OP/ED): Performed by: INTERNAL MEDICINE

## 2024-05-08 PROCEDURE — 7100000009 HC PHASE TWO TIME - INITIAL BASE CHARGE

## 2024-05-08 PROCEDURE — 43235 EGD DIAGNOSTIC BRUSH WASH: CPT | Performed by: INTERNAL MEDICINE

## 2024-05-08 RX ORDER — MEPERIDINE HYDROCHLORIDE 25 MG/ML
INJECTION INTRAMUSCULAR; INTRAVENOUS; SUBCUTANEOUS AS NEEDED
Status: COMPLETED | OUTPATIENT
Start: 2024-05-08 | End: 2024-05-08

## 2024-05-08 RX ORDER — MIDAZOLAM HYDROCHLORIDE 1 MG/ML
INJECTION, SOLUTION INTRAMUSCULAR; INTRAVENOUS AS NEEDED
Status: COMPLETED | OUTPATIENT
Start: 2024-05-08 | End: 2024-05-08

## 2024-05-08 RX ORDER — SODIUM CHLORIDE, SODIUM LACTATE, POTASSIUM CHLORIDE, CALCIUM CHLORIDE 600; 310; 30; 20 MG/100ML; MG/100ML; MG/100ML; MG/100ML
20 INJECTION, SOLUTION INTRAVENOUS CONTINUOUS
Status: DISCONTINUED | OUTPATIENT
Start: 2024-05-08 | End: 2024-05-09 | Stop reason: HOSPADM

## 2024-05-08 RX ADMIN — MIDAZOLAM HYDROCHLORIDE 2 MG: 1 INJECTION INTRAMUSCULAR; INTRAVENOUS at 08:52

## 2024-05-08 RX ADMIN — MEPERIDINE HYDROCHLORIDE 50 MG: 25 INJECTION INTRAMUSCULAR; INTRAVENOUS; SUBCUTANEOUS at 08:52

## 2024-05-08 RX ADMIN — SODIUM CHLORIDE, POTASSIUM CHLORIDE, SODIUM LACTATE AND CALCIUM CHLORIDE 20 ML/HR: 600; 310; 30; 20 INJECTION, SOLUTION INTRAVENOUS at 08:02

## 2024-05-08 ASSESSMENT — PAIN SCALES - GENERAL
PAINLEVEL_OUTOF10: 0 - NO PAIN

## 2024-05-08 ASSESSMENT — COLUMBIA-SUICIDE SEVERITY RATING SCALE - C-SSRS
6. HAVE YOU EVER DONE ANYTHING, STARTED TO DO ANYTHING, OR PREPARED TO DO ANYTHING TO END YOUR LIFE?: NO
2. HAVE YOU ACTUALLY HAD ANY THOUGHTS OF KILLING YOURSELF?: NO
1. IN THE PAST MONTH, HAVE YOU WISHED YOU WERE DEAD OR WISHED YOU COULD GO TO SLEEP AND NOT WAKE UP?: NO

## 2024-05-08 ASSESSMENT — PAIN - FUNCTIONAL ASSESSMENT
PAIN_FUNCTIONAL_ASSESSMENT: 0-10

## 2024-05-08 NOTE — H&P
History Of Present Illness  William Philip is a 73 y.o. male presenting with portal HTN.     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 04/20/2021    Chronic marginal ulcer    Contusion of left lower leg, initial encounter 05/20/2019    Contusion of left lower leg, initial encounter    Contusion of right lower leg, subsequent encounter 05/22/2019    Contusion of right lower leg, subsequent encounter    Morbid (severe) obesity due to excess calories (Multi) 11/21/2019    Morbid obesity with body mass index of 60.0-69.9 in adult    Neoplasm of unspecified behavior of bone, soft tissue, and skin 08/08/2016    Skin growth    Obesity, unspecified 04/20/2021    Class 1 obesity with serious comorbidity in adult    Other abnormal glucose     Abnormal glucose level    Partial intestinal obstruction, unspecified as to cause (Multi) 02/01/2021    Small bowel obstruction, partial    Personal history of diseases of the skin and subcutaneous tissue 01/09/2020    History of dermatitis    Personal history of diseases of the skin and subcutaneous tissue 01/09/2020    History of dermatitis    Personal history of diseases of the skin and subcutaneous tissue 10/07/2020    History of contact dermatitis    Personal history of other diseases of the respiratory system 12/03/2018    History of acute sinusitis    Personal history of other diseases of the respiratory system 12/04/2017    History of acute sinusitis    Personal history of other diseases of the respiratory system 11/23/2015     History of sinusitis    Personal history of other mental and behavioral disorders 10/12/2021    History of anxiety    Personal history of other specified conditions 04/06/2021    History of abdominal pain    Personal history of other specified conditions 05/06/2020    History of lump of right breast    Xerosis cutis 01/22/2016    Dry skin dermatitis     Surgical History  Past Surgical History:   Procedure Laterality Date    CARPAL TUNNEL RELEASE  04/09/2013    Neuroplasty Decompression Median Nerve At Carpal Tunnel    IR VENOGRAM HEPATIC  06/07/2022    IR VENOGRAM HEPATIC 6/7/2022 [facility]    KNEE ARTHROPLASTY Right 04/09/2013    Knee Arthroplasty    MR HEAD ANGIO WO IV CONTRAST  01/06/2020    MR HEAD ANGIO WO IV CONTRAST 1/6/2020 [facility]    MR NECK ANGIO WO IV CONTRAST  01/06/2020    MR NECK ANGIO WO IV CONTRAST 1/6/2020 [facility]    OTHER SURGICAL HISTORY  04/09/2013    Choledochectomy    OTHER SURGICAL HISTORY  02/01/2021    Christopher-en-Y gastric bypass    OTHER SURGICAL HISTORY  02/01/2021    Repair Of Paraesophageal Hiatus Hernia    OTHER SURGICAL HISTORY  12/02/2020    Exploratory laparotomy    OTHER SURGICAL HISTORY  10/07/2022    Exploratory laparoscopy    OTHER SURGICAL HISTORY  05/11/2021    Vascular surgical procedure    US GUIDED ABDOMINAL PARACENTESIS  04/15/2022    US GUIDED ABDOMINAL PARACENTESIS 4/15/2022 [facility]    US GUIDED ABDOMINAL PARACENTESIS  05/02/2022    US GUIDED ABDOMINAL PARACENTESIS 5/2/2022 [facility]    US GUIDED ABDOMINAL PARACENTESIS  06/03/2022    US GUIDED ABDOMINAL PARACENTESIS 6/3/2022 [facility]    US GUIDED ABDOMINAL PARACENTESIS  06/27/2022    US GUIDED ABDOMINAL PARACENTESIS 6/27/2022 [facility]    US GUIDED ABDOMINAL PARACENTESIS  01/11/2023    US GUIDED ABDOMINAL PARACENTESIS 1/11/2023 [facility]    US GUIDED ABDOMINAL PARACENTESIS  02/27/2023    US GUIDED ABDOMINAL PARACENTESIS [facility]    US GUIDED ABDOMINAL  PARACENTESIS  08/01/2022    US GUIDED ABDOMINAL PARACENTESIS 8/1/2022 [facility]    US GUIDED ABDOMINAL PARACENTESIS  08/30/2022    US GUIDED ABDOMINAL PARACENTESIS 8/30/2022 [facility]    US GUIDED ABDOMINAL PARACENTESIS  11/15/2022    US GUIDED ABDOMINAL PARACENTESIS 11/15/2022 [facility]    US GUIDED ABDOMINAL PARACENTESIS  04/05/2023    US GUIDED ABDOMINAL PARACENTESIS [facility]    US GUIDED ABDOMINAL PARACENTESIS  07/06/2023    US GUIDED ABDOMINAL PARACENTESIS 7/6/2023 [facility]    US GUIDED ABDOMINAL PARACENTESIS  08/23/2023    US GUIDED ABDOMINAL PARACENTESIS 8/23/2023 [facility]    US GUIDED ABDOMINAL PARACENTESIS  05/23/2023    US GUIDED ABDOMINAL PARACENTESIS [facility]    US GUIDED ABDOMINAL PARACENTESIS  10/03/2023    US GUIDED ABDOMINAL PARACENTESIS 10/3/2023 [facility]    US GUIDED ABDOMINAL PARACENTESIS  11/13/2023    US GUIDED ABDOMINAL PARACENTESIS 11/13/2023 [facility]    US GUIDED ABDOMINAL PARACENTESIS  12/18/2023    US GUIDED ABDOMINAL PARACENTESIS 12/18/2023 Ganga Millard MD [facility]     Social History  He reports that he quit smoking about 21 years ago. His smoking use included cigarettes. He has never used smokeless tobacco. He reports that he does not currently use alcohol. He reports that he does not use drugs.    Family History  No family history on file.     Allergies  No Known Allergies  Review of Systems  Pre-sedation Evaluation:  ASA Classification - ASA 3 - Patient with moderate systemic disease with functional limitations  Mallampati Score - II (hard and soft palate, upper portion of tonsils and uvula visible)    Physical Exam  Vitals reviewed.   Constitutional:       Appearance: Normal appearance.   Cardiovascular:      Rate and Rhythm: Normal rate and regular rhythm.   Pulmonary:      Effort: Pulmonary effort is normal.      Breath sounds: Normal breath sounds.   Neurological:      Mental Status: He is alert.          Last Recorded Vitals  Blood pressure 129/60, pulse 51,  "temperature 36 °C (96.8 °F), temperature source Temporal, resp. rate 16, height 1.702 m (5' 7\"), weight 81.6 kg (179 lb 14.3 oz), SpO2 100%.     Assessment/Plan   R/O varices for EGD     PTA/Current Medications:  (Not in a hospital admission)    Current Outpatient Medications   Medication Sig Dispense Refill    aMILoride (Midamor) 5 mg tablet Take 2 tablets (10 mg) by mouth once daily. 60 tablet 3    buPROPion (Wellbutrin) 75 mg tablet Take 1 tablet (75 mg) by mouth 2 times a day. 60 tablet 5    CALCIUM CARBONATE-VITAMIN D3 ORAL Take by mouth.      cyanocobalamin, vitamin B-12, 5,000 mcg tablet, sublingual Place under the tongue once daily.      furosemide (Lasix) 20 mg tablet Take 1 tablet (20 mg) by mouth once daily. 30 tablet 11    pantoprazole (ProtoNix) 40 mg EC tablet TAKE ONE TABLET BY MOUTH TWO TIMES A DAY, 30 MINUTES BEFORE BREAKFAST AND AT BEDTIME. 180 tablet 1    pediatric multivitamin (Flintstones Multivitamin) tablet,chewable Chew 1 tablet once daily.      spironolactone (Aldactone) 25 mg tablet Take 4 tablets (100 mg) by mouth once daily. 120 tablet 11     Current Facility-Administered Medications   Medication Dose Route Frequency Provider Last Rate Last Admin    lactated Ringer's infusion  20 mL/hr intravenous Continuous Mejia Andrews MD 20 mL/hr at 05/08/24 0802 20 mL/hr at 05/08/24 0802     Mejia Andrews MD  "

## 2024-05-08 NOTE — DISCHARGE INSTRUCTIONS

## 2024-05-09 ASSESSMENT — PAIN SCALES - GENERAL: PAINLEVEL_OUTOF10: 0 - NO PAIN

## 2024-05-09 NOTE — ADDENDUM NOTE
Encounter addended by: Cristal Troncoso RN on: 5/9/2024 8:26 AM   Actions taken: Contacts section saved, Flowsheet accepted

## 2024-06-10 ENCOUNTER — HOSPITAL ENCOUNTER (OUTPATIENT)
Dept: RADIOLOGY | Facility: HOSPITAL | Age: 74
Discharge: HOME | End: 2024-06-10
Payer: MEDICARE

## 2024-06-10 ENCOUNTER — LAB (OUTPATIENT)
Dept: LAB | Facility: LAB | Age: 74
End: 2024-06-10
Payer: MEDICARE

## 2024-06-10 DIAGNOSIS — K76.6 PORTAL HYPERTENSION (MULTI): ICD-10-CM

## 2024-06-10 DIAGNOSIS — E83.110 HEREDITARY HEMOCHROMATOSIS (CMS-HCC): ICD-10-CM

## 2024-06-10 DIAGNOSIS — K74.60 CIRRHOSIS OF LIVER WITH ASCITES, UNSPECIFIED HEPATIC CIRRHOSIS TYPE (MULTI): ICD-10-CM

## 2024-06-10 DIAGNOSIS — R18.8 CIRRHOSIS OF LIVER WITH ASCITES, UNSPECIFIED HEPATIC CIRRHOSIS TYPE (MULTI): ICD-10-CM

## 2024-06-10 LAB
AFP SERPL-MCNC: <4 NG/ML (ref 0–9)
ALBUMIN SERPL BCP-MCNC: 4.2 G/DL (ref 3.4–5)
ALP SERPL-CCNC: 75 U/L (ref 33–136)
ALT SERPL W P-5'-P-CCNC: 13 U/L (ref 10–52)
ANION GAP SERPL CALC-SCNC: 13 MMOL/L (ref 10–20)
AST SERPL W P-5'-P-CCNC: 20 U/L (ref 9–39)
BILIRUB DIRECT SERPL-MCNC: 0.1 MG/DL (ref 0–0.3)
BILIRUB SERPL-MCNC: 0.6 MG/DL (ref 0–1.2)
BUN SERPL-MCNC: 15 MG/DL (ref 6–23)
CALCIUM SERPL-MCNC: 9.4 MG/DL (ref 8.6–10.3)
CHLORIDE SERPL-SCNC: 102 MMOL/L (ref 98–107)
CO2 SERPL-SCNC: 28 MMOL/L (ref 21–32)
CREAT SERPL-MCNC: 0.96 MG/DL (ref 0.5–1.3)
EGFRCR SERPLBLD CKD-EPI 2021: 83 ML/MIN/1.73M*2
ERYTHROCYTE [DISTWIDTH] IN BLOOD BY AUTOMATED COUNT: 13.1 % (ref 11.5–14.5)
FERRITIN SERPL-MCNC: 275 NG/ML (ref 20–300)
GLUCOSE SERPL-MCNC: 81 MG/DL (ref 74–99)
HBV CORE AB SER QL: NONREACTIVE
HCT VFR BLD AUTO: 48.5 % (ref 41–52)
HGB BLD-MCNC: 15.3 G/DL (ref 13.5–17.5)
INR PPP: 1 (ref 0.9–1.1)
IRON SATN MFR SERPL: 31 % (ref 25–45)
IRON SERPL-MCNC: 96 UG/DL (ref 35–150)
MCH RBC QN AUTO: 31.5 PG (ref 26–34)
MCHC RBC AUTO-ENTMCNC: 31.5 G/DL (ref 32–36)
MCV RBC AUTO: 100 FL (ref 80–100)
NRBC BLD-RTO: 0 /100 WBCS (ref 0–0)
PLATELET # BLD AUTO: 207 X10*3/UL (ref 150–450)
POTASSIUM SERPL-SCNC: 4.7 MMOL/L (ref 3.5–5.3)
PROT SERPL-MCNC: 7.2 G/DL (ref 6.4–8.2)
PROTHROMBIN TIME: 11.3 SECONDS (ref 9.8–12.8)
RBC # BLD AUTO: 4.85 X10*6/UL (ref 4.5–5.9)
SODIUM SERPL-SCNC: 138 MMOL/L (ref 136–145)
TIBC SERPL-MCNC: 307 UG/DL (ref 240–445)
UIBC SERPL-MCNC: 211 UG/DL (ref 110–370)
WBC # BLD AUTO: 5.3 X10*3/UL (ref 4.4–11.3)

## 2024-06-10 PROCEDURE — 83550 IRON BINDING TEST: CPT

## 2024-06-10 PROCEDURE — 76705 ECHO EXAM OF ABDOMEN: CPT | Performed by: STUDENT IN AN ORGANIZED HEALTH CARE EDUCATION/TRAINING PROGRAM

## 2024-06-10 PROCEDURE — 80053 COMPREHEN METABOLIC PANEL: CPT

## 2024-06-10 PROCEDURE — 82728 ASSAY OF FERRITIN: CPT

## 2024-06-10 PROCEDURE — 76705 ECHO EXAM OF ABDOMEN: CPT

## 2024-06-10 PROCEDURE — 36415 COLL VENOUS BLD VENIPUNCTURE: CPT

## 2024-06-10 PROCEDURE — 85027 COMPLETE CBC AUTOMATED: CPT

## 2024-06-10 PROCEDURE — 86704 HEP B CORE ANTIBODY TOTAL: CPT

## 2024-06-10 PROCEDURE — 83540 ASSAY OF IRON: CPT

## 2024-06-10 PROCEDURE — 82105 ALPHA-FETOPROTEIN SERUM: CPT

## 2024-06-10 PROCEDURE — 82248 BILIRUBIN DIRECT: CPT

## 2024-06-10 PROCEDURE — 85610 PROTHROMBIN TIME: CPT

## 2024-06-24 ENCOUNTER — APPOINTMENT (OUTPATIENT)
Dept: PRIMARY CARE | Facility: CLINIC | Age: 74
End: 2024-06-24
Payer: MEDICARE

## 2024-06-24 VITALS
SYSTOLIC BLOOD PRESSURE: 138 MMHG | HEART RATE: 49 BPM | DIASTOLIC BLOOD PRESSURE: 70 MMHG | BODY MASS INDEX: 29.35 KG/M2 | OXYGEN SATURATION: 95 % | TEMPERATURE: 96.9 F | WEIGHT: 187.4 LBS

## 2024-06-24 DIAGNOSIS — I49.5 TACHY-BRADY SYNDROME (MULTI): ICD-10-CM

## 2024-06-24 DIAGNOSIS — K21.9 GASTROESOPHAGEAL REFLUX DISEASE, UNSPECIFIED WHETHER ESOPHAGITIS PRESENT: ICD-10-CM

## 2024-06-24 DIAGNOSIS — K76.6 PORTAL HYPERTENSION (MULTI): ICD-10-CM

## 2024-06-24 DIAGNOSIS — R18.8 CIRRHOSIS OF LIVER WITH ASCITES, UNSPECIFIED HEPATIC CIRRHOSIS TYPE (MULTI): ICD-10-CM

## 2024-06-24 DIAGNOSIS — I27.20 PULMONARY HYPERTENSION (MULTI): ICD-10-CM

## 2024-06-24 DIAGNOSIS — I82.511 CHRONIC DEEP VEIN THROMBOSIS (DVT) OF FEMORAL VEIN OF RIGHT LOWER EXTREMITY (MULTI): ICD-10-CM

## 2024-06-24 DIAGNOSIS — F32.A DEPRESSION, UNSPECIFIED DEPRESSION TYPE: Primary | ICD-10-CM

## 2024-06-24 DIAGNOSIS — K74.60 CIRRHOSIS OF LIVER WITH ASCITES, UNSPECIFIED HEPATIC CIRRHOSIS TYPE (MULTI): ICD-10-CM

## 2024-06-24 DIAGNOSIS — I10 BENIGN ESSENTIAL HYPERTENSION: ICD-10-CM

## 2024-06-24 DIAGNOSIS — Z95.828 S/P INSERTION OF INFERIOR VENA CAVAL FILTER: ICD-10-CM

## 2024-06-24 PROCEDURE — 99214 OFFICE O/P EST MOD 30 MIN: CPT | Performed by: INTERNAL MEDICINE

## 2024-06-24 PROCEDURE — 1160F RVW MEDS BY RX/DR IN RCRD: CPT | Performed by: INTERNAL MEDICINE

## 2024-06-24 PROCEDURE — 3078F DIAST BP <80 MM HG: CPT | Performed by: INTERNAL MEDICINE

## 2024-06-24 PROCEDURE — 1159F MED LIST DOCD IN RCRD: CPT | Performed by: INTERNAL MEDICINE

## 2024-06-24 PROCEDURE — 3075F SYST BP GE 130 - 139MM HG: CPT | Performed by: INTERNAL MEDICINE

## 2024-06-24 PROCEDURE — 1036F TOBACCO NON-USER: CPT | Performed by: INTERNAL MEDICINE

## 2024-06-24 RX ORDER — PANTOPRAZOLE SODIUM 40 MG/1
TABLET, DELAYED RELEASE ORAL
Qty: 180 TABLET | Refills: 1 | Status: SHIPPED | OUTPATIENT
Start: 2024-06-24

## 2024-06-24 RX ORDER — BUPROPION HYDROCHLORIDE 75 MG/1
75 TABLET ORAL 2 TIMES DAILY
Qty: 60 TABLET | Refills: 5 | Status: CANCELLED | OUTPATIENT
Start: 2024-06-24

## 2024-06-24 ASSESSMENT — ENCOUNTER SYMPTOMS
DIARRHEA: 0
ABDOMINAL PAIN: 0
ARTHRALGIAS: 0
BRUISES/BLEEDS EASILY: 0
PALPITATIONS: 0
HEADACHES: 0
SORE THROAT: 0
UNEXPECTED WEIGHT CHANGE: 0
BLOOD IN STOOL: 0
DIFFICULTY URINATING: 0
SINUS PAIN: 0
FATIGUE: 0
WHEEZING: 0
DIZZINESS: 0
FEVER: 0
HYPERTENSION: 1
COUGH: 0

## 2024-06-24 NOTE — PROGRESS NOTES
Subjective   Patient ID: William Philip is a 73 y.o. male who presents for Hypertension and Results (Would like results of US).    -Recent blood work and plan of care reviewed with patient  -Patient developed gynecomastia switch to amiloride 5 mg daily doing much better  - Liver cirrhosis stage III portal hypertension ascites patient did not have any paracentesis now for 3 months doing well recent ultrasound small amount of ascites continue monitor conservatively follow-up referral closely  - Bilateral hand tremors only with fatigue and tiredness no continuous neurological radiation no focal signs  Tremors possibly due to underlying medical condition and liver cirrhosis patient with continued monitoring closely no change  - Seasonal depression patient started on bupropion 75 mg twice a day continue with current medication  --Fatigue and tiredness underlying sleep apnea  Continue CPAP  --Chronic bradycardia no change asymptomatic continue monitoring conservatively  -Hypertension controlled  --Urinary incontinence improved since surgery  -History of tachyarrhythmias no recurrence no chest pain  -History of nephrolithiasis no recurrence history of DVT lower extremity resolved, underlying IVC filter no recurrence  Follow-up 3 months       Hypertension  Pertinent negatives include no chest pain, headaches or palpitations.          Review of Systems   Constitutional:  Negative for fatigue, fever and unexpected weight change.   HENT:  Negative for congestion, ear discharge, ear pain, mouth sores, sinus pain and sore throat.    Eyes:  Negative for visual disturbance.   Respiratory:  Negative for cough and wheezing.    Cardiovascular:  Negative for chest pain, palpitations and leg swelling.   Gastrointestinal:  Negative for abdominal pain, blood in stool and diarrhea.   Genitourinary:  Negative for difficulty urinating.   Musculoskeletal:  Negative for arthralgias.   Skin:  Negative for rash.   Neurological:  Negative for  dizziness and headaches.   Hematological:  Does not bruise/bleed easily.   Psychiatric/Behavioral:  Negative for behavioral problems.    All other systems reviewed and are negative.      Objective   Lab Results   Component Value Date    HGBA1C 4.6 05/14/2021      /70   Pulse (!) 49   Temp 36.1 °C (96.9 °F)   Wt 85 kg (187 lb 6.4 oz)   SpO2 95%   BMI 29.35 kg/m²   === 06/10/24 ===    US ABDOMEN LIMITED LIVER    - Impression -  Small abdominal ascites.    Signed by: Db Berry 6/11/2024 9:11 PM  Dictation workstation:   [id number]     Lab Results   Component Value Date    WBC 5.3 06/10/2024    HGB 15.3 06/10/2024    HCT 48.5 06/10/2024     06/10/2024    CHOL 93 01/06/2020    TRIG 62 01/06/2020    HDL 40.0 01/06/2020    ALT 13 06/10/2024    AST 20 06/10/2024     06/10/2024    K 4.7 06/10/2024     06/10/2024    CREATININE 0.96 06/10/2024    BUN 15 06/10/2024    CO2 28 06/10/2024    TSH 3.63 10/16/2023    PSA 1.20 05/02/2022    INR 1.0 06/10/2024    HGBA1C 4.6 05/14/2021     par   Physical Exam    Assessment/Plan   William was seen today for hypertension and results.  Diagnoses and all orders for this visit:  Depression, unspecified depression type (Primary)  Chronic deep vein thrombosis (DVT) of femoral vein of right lower extremity (Multi)  Pulmonary hypertension (Multi)  Portal hypertension (Multi)  Tachy-hernan syndrome (Multi)  S/P insertion of inferior vena caval filter  Benign essential hypertension  Cirrhosis of liver with ascites, unspecified hepatic cirrhosis type (Multi)  Gastroesophageal reflux disease, unspecified whether esophagitis present  -     pantoprazole (ProtoNix) 40 mg EC tablet; TAKE ONE TABLET BY MOUTH TWO TIMES A DAY, 30 MINUTES BEFORE BREAKFAST AND AT BEDTIME.  Other orders  -     Follow Up In Primary Care - Established  -     Follow Up In Primary Care - Established; Future  -     Follow Up In Primary Care - Established; Future   -Recent blood work and plan of  care reviewed with patient  -Patient developed gynecomastia switch to amiloride 5 mg daily doing much better  - Liver cirrhosis stage III portal hypertension ascites patient did not have any paracentesis now for 3 months doing well recent ultrasound small amount of ascites continue monitor conservatively follow-up referral closely  - Bilateral hand tremors only with fatigue and tiredness no continuous neurological radiation no focal signs  Tremors possibly due to underlying medical condition and liver cirrhosis patient with continued monitoring closely no change  - Seasonal depression patient started on bupropion 75 mg twice a day continue with current medication  --Fatigue and tiredness underlying sleep apnea  Continue CPAP  --Chronic bradycardia no change asymptomatic continue monitoring conservatively  -Hypertension controlled  --Urinary incontinence improved since surgery  -History of tachyarrhythmias no recurrence no chest pain  -History of nephrolithiasis no recurrence history of DVT lower extremity resolved, underlying IVC filter no recurrence  Follow-up 3 months

## 2024-07-15 ENCOUNTER — TELEPHONE (OUTPATIENT)
Dept: GASTROENTEROLOGY | Facility: CLINIC | Age: 74
End: 2024-07-15
Payer: MEDICARE

## 2024-07-15 NOTE — TELEPHONE ENCOUNTER
Left VM for patient with instructions for labs in September and follow up appointment with Dr Gaspar in October.

## 2024-07-27 ENCOUNTER — HOSPITAL ENCOUNTER (EMERGENCY)
Facility: HOSPITAL | Age: 74
Discharge: HOME | End: 2024-07-27
Payer: MEDICARE

## 2024-07-27 VITALS
WEIGHT: 180 LBS | OXYGEN SATURATION: 99 % | SYSTOLIC BLOOD PRESSURE: 133 MMHG | TEMPERATURE: 98.1 F | RESPIRATION RATE: 16 BRPM | DIASTOLIC BLOOD PRESSURE: 71 MMHG | HEIGHT: 67 IN | HEART RATE: 58 BPM | BODY MASS INDEX: 28.25 KG/M2

## 2024-07-27 DIAGNOSIS — T65.91XA INGESTION OF TOXIN, ACCIDENTAL OR UNINTENTIONAL, INITIAL ENCOUNTER: Primary | ICD-10-CM

## 2024-07-27 PROCEDURE — 99284 EMERGENCY DEPT VISIT MOD MDM: CPT

## 2024-07-27 ASSESSMENT — PAIN - FUNCTIONAL ASSESSMENT: PAIN_FUNCTIONAL_ASSESSMENT: 0-10

## 2024-07-27 NOTE — ED PROVIDER NOTES
Limitations to history: None  Independent Historians: Family  External Records Reviewed: HIE, OARRS, outpatient notes, inpatient notes, paper charts if needed    History of Present Illness:  Patient is a 74-year-old male presents to ED chief complaint of a sore throat.  Patient reports that he accidentally ingested wasp and hornet spray, reports it sprayed him in the mouth.  Patient denies any loss of consciousness, difficulty breathing, difficulty swallowing, and/or chest pain.  Patient reports that he was worried because he was reading the back of the can, and it advised him to seek medical attention immediately.  Patient reports he has a past medical history of cirrhosis, hypertension.states he is not taking blood thinners.  Patient is alert and oriented x 3 upon examination, in otherwise no apparent distress.      Denies HA, C/P, SOB, ABD pain, Nausea, Vomiting, Diarrhea, Weakness, Dizziness, Fever, Chills.    PMFSH:   As per HPI, otherwise nurses notes reviewed in EMR    Physical Exam:  Appearance: Alert, oriented x3, supine on exam table with head elevated, cooperative, in no acute distress. Well nourished & well hydrated.      Skin: Intact, dry skin, no lesions, rash, petechiae or purpura.     Eyes: PERRLA, EOMs intact, Conjunctiva pink with no redness or exudates. No scleral icterus.     Ears: Hearing grossly intact.      Nose: Nares patent, no epistaxis.     Mouth: Uvula midline, tonsils are nonerythematous without exudate.  Dentition without concerning abnormalities. no obstruction of posterior pharynx.     Neck: Supple, without meningismus. Trachea at midline.     Pulmonary: Clear bilaterally with good chest wall excursion. No rales, rhonchi or wheezing. No accessory muscle use or stridor. Talking in full sentences.     Cardiac: Normal S1, S2 without murmur, rub, gallop or extrasystole.     Abdomen: Soft, nontender to light and deep palpation to all quadrants, normoactive bowel sounds.  No palpable  organomegaly.  No rebound or guarding.     Genitourinary: Physical exam deferred.     Musculoskeletal: Normal gait. Full range of motion to all extremities. Rest of the exam reveals no pain on palpation, instability, or deformity. Pulses full and equal. No cyanosis or clubbing. capillary refill <2 seconds to all examined digits.     Neurological:  Cranial nerves II through XII are grossly intact, normal sensation, no weakness, no focal findings identified.      Psychiatric: Appropriate mood and affect.          Repeat Evaluation below    Summary:  Medical Decision Making:   Patient presented as described in HPI. Patient case including ROS, PE, and treatment and plan discussed with ED attending if attached as cosigner. Due to patients presentation orders completed include as documented.  Patient evaluated for complaints of accidental ingestion of wasp and hornet spray.  Patient was found to be afebrile, nontachycardic, nonhypoxic.  Patient denied any complaints of chest pain or shortness of breath.  Patient denied any difficulty swallowing.  Patient is alert and oriented x 3 upon examination.  Poison control contacted.  Poison control advises hydration, and no further testing or evaluation at this time.  Patient is aware of recommendations of poison control.  Patient aware that if he develops chest pain, shortness of breath, difficulty swallowing to return to ED for further evaluation and treatment.  Patient was advised to follow up with PCP or recommended provider in 2-3 days for another evaluation and exam. I advised patient/guardian to return or go to closest emergency room immediately if symptoms change, get worse, new symptoms develop prior to follow up. If there is no improvement in symptoms in the next 24 hours they are advised to return for further evaluation and exam. I also explained the plan and treatment course. Patient/guardian is in agreement with plan, treatment course, and follow up and states  verbally that they will comply.    Tests/Medications/Escalations of Care considered but not given:    Patient care discussed with: N/A  Social Determinants affecting care: N/A    Final diagnosis and disposition as documented in impression    Homegoing. I discussed the differential; results and discharge plan with the patient and/or family/friend/caregiver if present.  I emphasized the importance of follow-up with the physician I referred them to in the timeframe recommended.  I explained reasons for the patient to return to the Emergency Department. They agreed that if they feel their condition is worsening or if they have any other concern they should call 911 immediately for further assistance. I gave the patient an opportunity to ask all questions they had and answered all of them accordingly. They understand return precautions and discharge instructions. The patient and/or family/friend/caregiver expressed understanding verbally and that they would comply.       Disposition:  Discharge       This note has been transcribed using voice recognition and may contain grammatical errors, misplaced words, incorrect words, incorrect phrases or other errors.     Mariola Sosa, DEJA-MIRA  07/27/24 0632

## 2024-08-17 DIAGNOSIS — R18.8 CIRRHOSIS OF LIVER WITH ASCITES, UNSPECIFIED HEPATIC CIRRHOSIS TYPE (MULTI): ICD-10-CM

## 2024-08-17 DIAGNOSIS — K76.6 PORTAL HYPERTENSION (MULTI): ICD-10-CM

## 2024-08-17 DIAGNOSIS — K74.60 CIRRHOSIS OF LIVER WITH ASCITES, UNSPECIFIED HEPATIC CIRRHOSIS TYPE (MULTI): ICD-10-CM

## 2024-08-19 RX ORDER — AMILORIDE HYDROCHLORIDE 5 MG/1
10 TABLET ORAL DAILY
Qty: 60 TABLET | Refills: 11 | Status: SHIPPED | OUTPATIENT
Start: 2024-08-19 | End: 2024-08-20 | Stop reason: SDUPTHER

## 2024-08-20 DIAGNOSIS — K21.9 GASTROESOPHAGEAL REFLUX DISEASE, UNSPECIFIED WHETHER ESOPHAGITIS PRESENT: ICD-10-CM

## 2024-08-20 DIAGNOSIS — R18.8 CIRRHOSIS OF LIVER WITH ASCITES, UNSPECIFIED HEPATIC CIRRHOSIS TYPE (MULTI): ICD-10-CM

## 2024-08-20 DIAGNOSIS — K74.60 CIRRHOSIS OF LIVER WITH ASCITES, UNSPECIFIED HEPATIC CIRRHOSIS TYPE (MULTI): ICD-10-CM

## 2024-08-20 DIAGNOSIS — K76.6 PORTAL HYPERTENSION (MULTI): ICD-10-CM

## 2024-08-20 RX ORDER — PANTOPRAZOLE SODIUM 40 MG/1
TABLET, DELAYED RELEASE ORAL
Qty: 180 TABLET | Refills: 1 | Status: SHIPPED | OUTPATIENT
Start: 2024-08-20

## 2024-08-20 RX ORDER — AMILORIDE HYDROCHLORIDE 5 MG/1
10 TABLET ORAL DAILY
Qty: 60 TABLET | Refills: 11 | Status: SHIPPED | OUTPATIENT
Start: 2024-08-20

## 2024-09-18 ENCOUNTER — APPOINTMENT (OUTPATIENT)
Dept: RADIOLOGY | Facility: HOSPITAL | Age: 74
End: 2024-09-18
Payer: MEDICARE

## 2024-09-25 ENCOUNTER — APPOINTMENT (OUTPATIENT)
Dept: PRIMARY CARE | Facility: CLINIC | Age: 74
End: 2024-09-25
Payer: MEDICARE

## 2024-09-25 ENCOUNTER — LAB (OUTPATIENT)
Dept: LAB | Facility: LAB | Age: 74
End: 2024-09-25
Payer: MEDICARE

## 2024-09-25 VITALS
DIASTOLIC BLOOD PRESSURE: 72 MMHG | SYSTOLIC BLOOD PRESSURE: 126 MMHG | HEIGHT: 67 IN | HEART RATE: 52 BPM | TEMPERATURE: 97 F | OXYGEN SATURATION: 100 % | WEIGHT: 190 LBS | BODY MASS INDEX: 29.82 KG/M2

## 2024-09-25 DIAGNOSIS — Z00.00 ROUTINE GENERAL MEDICAL EXAMINATION AT HEALTH CARE FACILITY: Primary | ICD-10-CM

## 2024-09-25 DIAGNOSIS — I10 BENIGN ESSENTIAL HYPERTENSION: ICD-10-CM

## 2024-09-25 DIAGNOSIS — E53.8 VITAMIN B12 DEFICIENCY: ICD-10-CM

## 2024-09-25 DIAGNOSIS — R18.8 CIRRHOSIS OF LIVER WITH ASCITES, UNSPECIFIED HEPATIC CIRRHOSIS TYPE (MULTI): ICD-10-CM

## 2024-09-25 DIAGNOSIS — K76.6 PORTAL HYPERTENSION (MULTI): ICD-10-CM

## 2024-09-25 DIAGNOSIS — R53.83 OTHER FATIGUE: ICD-10-CM

## 2024-09-25 DIAGNOSIS — K74.60 CIRRHOSIS OF LIVER WITH ASCITES, UNSPECIFIED HEPATIC CIRRHOSIS TYPE (MULTI): ICD-10-CM

## 2024-09-25 DIAGNOSIS — Z23 FLU VACCINE NEED: ICD-10-CM

## 2024-09-25 DIAGNOSIS — E83.110 HEREDITARY HEMOCHROMATOSIS (CMS-HCC): ICD-10-CM

## 2024-09-25 DIAGNOSIS — E55.9 VITAMIN D DEFICIENCY, UNSPECIFIED: ICD-10-CM

## 2024-09-25 DIAGNOSIS — I10 HYPERTENSION, UNSPECIFIED TYPE: ICD-10-CM

## 2024-09-25 DIAGNOSIS — E78.00 HYPERCHOLESTEREMIA: ICD-10-CM

## 2024-09-25 DIAGNOSIS — Z79.899 HIGH RISK MEDICATION USE: ICD-10-CM

## 2024-09-25 DIAGNOSIS — F32.A DEPRESSION, UNSPECIFIED DEPRESSION TYPE: ICD-10-CM

## 2024-09-25 DIAGNOSIS — I27.20 PULMONARY HYPERTENSION (MULTI): ICD-10-CM

## 2024-09-25 DIAGNOSIS — R25.1 TREMOR OF BOTH HANDS: ICD-10-CM

## 2024-09-25 LAB
25(OH)D3 SERPL-MCNC: 53 NG/ML (ref 30–100)
ALBUMIN SERPL BCP-MCNC: 3.9 G/DL (ref 3.4–5)
ALBUMIN SERPL BCP-MCNC: 3.9 G/DL (ref 3.4–5)
ALP SERPL-CCNC: 71 U/L (ref 33–136)
ALP SERPL-CCNC: 71 U/L (ref 33–136)
ALT SERPL W P-5'-P-CCNC: 10 U/L (ref 10–52)
ALT SERPL W P-5'-P-CCNC: 10 U/L (ref 10–52)
ANION GAP SERPL CALC-SCNC: 9 MMOL/L (ref 10–20)
AST SERPL W P-5'-P-CCNC: 17 U/L (ref 9–39)
AST SERPL W P-5'-P-CCNC: 17 U/L (ref 9–39)
BASOPHILS # BLD AUTO: 0.07 X10*3/UL (ref 0–0.1)
BASOPHILS NFR BLD AUTO: 1.3 %
BILIRUB DIRECT SERPL-MCNC: 0.2 MG/DL (ref 0–0.3)
BILIRUB SERPL-MCNC: 0.7 MG/DL (ref 0–1.2)
BILIRUB SERPL-MCNC: 0.7 MG/DL (ref 0–1.2)
BUN SERPL-MCNC: 14 MG/DL (ref 6–23)
CALCIUM SERPL-MCNC: 9.3 MG/DL (ref 8.6–10.3)
CHLORIDE SERPL-SCNC: 104 MMOL/L (ref 98–107)
CHOLEST SERPL-MCNC: 119 MG/DL (ref 0–199)
CHOLESTEROL/HDL RATIO: 2.4
CO2 SERPL-SCNC: 28 MMOL/L (ref 21–32)
CREAT SERPL-MCNC: 0.91 MG/DL (ref 0.5–1.3)
EGFRCR SERPLBLD CKD-EPI 2021: 88 ML/MIN/1.73M*2
EOSINOPHIL # BLD AUTO: 0.11 X10*3/UL (ref 0–0.4)
EOSINOPHIL NFR BLD AUTO: 2.1 %
ERYTHROCYTE [DISTWIDTH] IN BLOOD BY AUTOMATED COUNT: 13.2 % (ref 11.5–14.5)
GLUCOSE SERPL-MCNC: 76 MG/DL (ref 74–99)
HCT VFR BLD AUTO: 47.2 % (ref 41–52)
HDLC SERPL-MCNC: 49.7 MG/DL
HGB BLD-MCNC: 14.5 G/DL (ref 13.5–17.5)
IMM GRANULOCYTES # BLD AUTO: 0.01 X10*3/UL (ref 0–0.5)
IMM GRANULOCYTES NFR BLD AUTO: 0.2 % (ref 0–0.9)
INR PPP: 1.1 (ref 0.9–1.1)
LDLC SERPL CALC-MCNC: 53 MG/DL
LYMPHOCYTES # BLD AUTO: 1.4 X10*3/UL (ref 0.8–3)
LYMPHOCYTES NFR BLD AUTO: 26.5 %
MCH RBC QN AUTO: 30.5 PG (ref 26–34)
MCHC RBC AUTO-ENTMCNC: 30.7 G/DL (ref 32–36)
MCV RBC AUTO: 99 FL (ref 80–100)
MONOCYTES # BLD AUTO: 0.42 X10*3/UL (ref 0.05–0.8)
MONOCYTES NFR BLD AUTO: 8 %
NEUTROPHILS # BLD AUTO: 3.27 X10*3/UL (ref 1.6–5.5)
NEUTROPHILS NFR BLD AUTO: 61.9 %
NON HDL CHOLESTEROL: 69 MG/DL (ref 0–149)
NRBC BLD-RTO: 0 /100 WBCS (ref 0–0)
PLATELET # BLD AUTO: 201 X10*3/UL (ref 150–450)
POTASSIUM SERPL-SCNC: 4.5 MMOL/L (ref 3.5–5.3)
PROT SERPL-MCNC: 7 G/DL (ref 6.4–8.2)
PROT SERPL-MCNC: 7 G/DL (ref 6.4–8.2)
PROTHROMBIN TIME: 12.2 SECONDS (ref 9.8–12.8)
RBC # BLD AUTO: 4.75 X10*6/UL (ref 4.5–5.9)
SODIUM SERPL-SCNC: 136 MMOL/L (ref 136–145)
TRIGL SERPL-MCNC: 83 MG/DL (ref 0–149)
TSH SERPL-ACNC: 3.42 MIU/L (ref 0.44–3.98)
VIT B12 SERPL-MCNC: 646 PG/ML (ref 211–911)
VLDL: 17 MG/DL (ref 0–40)
WBC # BLD AUTO: 5.3 X10*3/UL (ref 4.4–11.3)

## 2024-09-25 PROCEDURE — 82306 VITAMIN D 25 HYDROXY: CPT

## 2024-09-25 PROCEDURE — 82607 VITAMIN B-12: CPT

## 2024-09-25 PROCEDURE — 99214 OFFICE O/P EST MOD 30 MIN: CPT | Performed by: INTERNAL MEDICINE

## 2024-09-25 PROCEDURE — 90662 IIV NO PRSV INCREASED AG IM: CPT | Performed by: INTERNAL MEDICINE

## 2024-09-25 PROCEDURE — 1160F RVW MEDS BY RX/DR IN RCRD: CPT | Performed by: INTERNAL MEDICINE

## 2024-09-25 PROCEDURE — 1036F TOBACCO NON-USER: CPT | Performed by: INTERNAL MEDICINE

## 2024-09-25 PROCEDURE — G0439 PPPS, SUBSEQ VISIT: HCPCS | Performed by: INTERNAL MEDICINE

## 2024-09-25 PROCEDURE — 1159F MED LIST DOCD IN RCRD: CPT | Performed by: INTERNAL MEDICINE

## 2024-09-25 PROCEDURE — G0008 ADMIN INFLUENZA VIRUS VAC: HCPCS | Performed by: INTERNAL MEDICINE

## 2024-09-25 PROCEDURE — 3074F SYST BP LT 130 MM HG: CPT | Performed by: INTERNAL MEDICINE

## 2024-09-25 PROCEDURE — 36415 COLL VENOUS BLD VENIPUNCTURE: CPT

## 2024-09-25 PROCEDURE — 1124F ACP DISCUSS-NO DSCNMKR DOCD: CPT | Performed by: INTERNAL MEDICINE

## 2024-09-25 PROCEDURE — 1170F FXNL STATUS ASSESSED: CPT | Performed by: INTERNAL MEDICINE

## 2024-09-25 PROCEDURE — 3078F DIAST BP <80 MM HG: CPT | Performed by: INTERNAL MEDICINE

## 2024-09-25 ASSESSMENT — ENCOUNTER SYMPTOMS
BRUISES/BLEEDS EASILY: 0
FATIGUE: 0
DIFFICULTY URINATING: 0
HEADACHES: 0
ABDOMINAL PAIN: 0
WHEEZING: 0
COUGH: 0
SINUS PAIN: 0
BLOOD IN STOOL: 0
DIZZINESS: 0
PALPITATIONS: 0
FEVER: 0
SORE THROAT: 0
ARTHRALGIAS: 0
UNEXPECTED WEIGHT CHANGE: 0
DIARRHEA: 0

## 2024-09-25 ASSESSMENT — ACTIVITIES OF DAILY LIVING (ADL)
DOING_HOUSEWORK: INDEPENDENT
GROCERY_SHOPPING: INDEPENDENT
DRESSING: INDEPENDENT
MANAGING_FINANCES: INDEPENDENT
BATHING: INDEPENDENT
TAKING_MEDICATION: INDEPENDENT

## 2024-09-25 ASSESSMENT — PATIENT HEALTH QUESTIONNAIRE - PHQ9
2. FEELING DOWN, DEPRESSED OR HOPELESS: NOT AT ALL
SUM OF ALL RESPONSES TO PHQ9 QUESTIONS 1 AND 2: 0
1. LITTLE INTEREST OR PLEASURE IN DOING THINGS: NOT AT ALL

## 2024-09-25 NOTE — PROGRESS NOTES
Subjective   Patient ID: William Philip is a 74 y.o. male who presents for Medicare Annual Wellness Visit Subsequent and Immunizations.    Annual Medicare physical  - Vaccinations reviewed and up-to-date  - Screening for colon cancer reviewed and up-to-date  Under care of GI now due to liver cirrhosis and portal hypertension ascites  Compensated continue low-salt diet  - Screening for depression negative  - Advance of directive reviewed    Follow-up  - Need to proceed with complete blood work  --Patient developed gynecomastia switch to amiloride 5 mg daily doing much better  - Liver cirrhosis stage III portal hypertension ascites going for paracentesis next month [facility] doing well otherwise.  - Bilateral hand tremors only with fatigue and tiredness no continuous neurological radiation no focal signs  Tremors possibly due to underlying medical condition and liver cirrhosis patient with continued monitoring closely no change controlled  - Seasonal depression patient started on bupropion 75 mg twice a day continue with current medication.  --Fatigue and tiredness underlying sleep apnea  Continue CPAP, compensated  --Chronic bradycardia no change asymptomatic continue monitoring conservatively  -Hypertension controlled  --Urinary incontinence improved since surgery  -History of tachyarrhythmias no recurrence no chest pain  -History of nephrolithiasis no recurrence history of DVT lower extremity resolved, underlying IVC filter no recurrence  Follow-up 3 months              Review of Systems   Constitutional:  Negative for fatigue, fever and unexpected weight change.   HENT:  Negative for congestion, ear discharge, ear pain, mouth sores, sinus pain and sore throat.    Eyes:  Negative for visual disturbance.   Respiratory:  Negative for cough and wheezing.    Cardiovascular:  Negative for chest pain, palpitations and leg swelling.   Gastrointestinal:  Negative for abdominal pain, blood in stool and  "diarrhea.   Genitourinary:  Negative for difficulty urinating.   Musculoskeletal:  Negative for arthralgias.   Skin:  Negative for rash.   Neurological:  Negative for dizziness and headaches.   Hematological:  Does not bruise/bleed easily.   Psychiatric/Behavioral:  Negative for behavioral problems.    All other systems reviewed and are negative.      Objective   Lab Results   Component Value Date    HGBA1C 4.6 05/14/2021      /72   Pulse 52   Temp 36.1 °C (97 °F)   Ht 1.702 m (5' 7\")   Wt 86.2 kg (190 lb)   SpO2 100%   BMI 29.76 kg/m²   Lab Results   Component Value Date    WBC 5.3 06/10/2024    HGB 15.3 06/10/2024    HCT 48.5 06/10/2024     06/10/2024    CHOL 93 01/06/2020    TRIG 62 01/06/2020    HDL 40.0 01/06/2020    ALT 13 06/10/2024    AST 20 06/10/2024     06/10/2024    K 4.7 06/10/2024     06/10/2024    CREATININE 0.96 06/10/2024    BUN 15 06/10/2024    CO2 28 06/10/2024    TSH 3.63 10/16/2023    PSA 1.20 05/02/2022    INR 1.0 06/10/2024    HGBA1C 4.6 05/14/2021     par   Physical Exam  Vitals and nursing note reviewed.   Constitutional:       Appearance: Normal appearance.   HENT:      Head: Normocephalic.      Nose: Nose normal.   Eyes:      Conjunctiva/sclera: Conjunctivae normal.      Pupils: Pupils are equal, round, and reactive to light.   Cardiovascular:      Rate and Rhythm: Regular rhythm.   Pulmonary:      Effort: Pulmonary effort is normal.      Breath sounds: Normal breath sounds.   Abdominal:      General: Abdomen is flat.      Palpations: Abdomen is soft.   Musculoskeletal:      Cervical back: Neck supple.   Skin:     General: Skin is warm.   Neurological:      General: No focal deficit present.      Mental Status: He is oriented to person, place, and time.   Psychiatric:         Mood and Affect: Mood normal.         Assessment/Plan   William was seen today for medicare annual wellness visit subsequent and immunizations.  Diagnoses and all orders for this " visit:  High risk medication use (Primary)  -     CBC and Auto Differential; Future  Flu vaccine need  -     Flu vaccine, trivalent, preservative free, HIGH-DOSE, age 65y+ (Fluzone)  Hypertension, unspecified type  -     Comprehensive Metabolic Panel; Future  Hypercholesteremia  -     Lipid Panel; Future  Other fatigue  -     TSH with reflex to Free T4 if abnormal; Future  Vitamin B12 deficiency  -     Vitamin B12; Future  Vitamin D deficiency, unspecified  -     Vitamin D 25-Hydroxy,Total (for eval of Vitamin D levels); Future  Portal hypertension (Multi)  Pulmonary hypertension (Multi)  Depression, unspecified depression type  Benign essential hypertension  Tremor of both hands  Cirrhosis of liver with ascites, unspecified hepatic cirrhosis type (Multi)  Other orders  -     Follow Up In Primary Care - Established; Future   Annual Medicare physical  - Vaccinations reviewed and up-to-date  - Screening for colon cancer reviewed and up-to-date  Under care of GI now due to liver cirrhosis and portal hypertension ascites  Compensated continue low-salt diet  - Screening for depression negative  - Advance of directive reviewed    Follow-up  - Need to proceed with complete blood work  --Patient developed gynecomastia switch to amiloride 5 mg daily doing much better  - Liver cirrhosis stage III portal hypertension ascites going for paracentesis next month [facility] doing well otherwise.  - Bilateral hand tremors only with fatigue and tiredness no continuous neurological radiation no focal signs  Tremors possibly due to underlying medical condition and liver cirrhosis patient with continued monitoring closely no change controlled  - Seasonal depression patient started on bupropion 75 mg twice a day continue with current medication.  --Fatigue and tiredness underlying sleep apnea  Continue CPAP, compensated  --Chronic bradycardia no change asymptomatic continue monitoring conservatively  -Hypertension  controlled  --Urinary incontinence improved since surgery  -History of tachyarrhythmias no recurrence no chest pain  -History of nephrolithiasis no recurrence history of DVT lower extremity resolved, underlying IVC filter no recurrence  Follow-up 3 months

## 2024-11-05 DIAGNOSIS — R18.8 OTHER ASCITES: ICD-10-CM

## 2024-11-05 RX ORDER — FUROSEMIDE 20 MG/1
20 TABLET ORAL DAILY
Qty: 90 TABLET | Refills: 3 | Status: SHIPPED | OUTPATIENT
Start: 2024-11-05

## 2024-11-08 ENCOUNTER — TRANSCRIBE ORDERS (OUTPATIENT)
Dept: ADMINISTRATIVE | Age: 74
End: 2024-11-08

## 2024-11-08 DIAGNOSIS — K74.60 HEPATIC CIRRHOSIS, UNSPECIFIED HEPATIC CIRRHOSIS TYPE, UNSPECIFIED WHETHER ASCITES PRESENT (HCC): Primary | ICD-10-CM

## 2024-11-11 ENCOUNTER — APPOINTMENT (OUTPATIENT)
Dept: SLEEP MEDICINE | Facility: CLINIC | Age: 74
End: 2024-11-11
Payer: MEDICARE

## 2024-11-11 VITALS
SYSTOLIC BLOOD PRESSURE: 142 MMHG | DIASTOLIC BLOOD PRESSURE: 82 MMHG | HEART RATE: 62 BPM | HEIGHT: 61 IN | WEIGHT: 191 LBS | OXYGEN SATURATION: 97 % | BODY MASS INDEX: 36.06 KG/M2

## 2024-11-11 DIAGNOSIS — G47.33 OBSTRUCTIVE SLEEP APNEA: Primary | ICD-10-CM

## 2024-11-11 PROCEDURE — 1159F MED LIST DOCD IN RCRD: CPT | Performed by: PSYCHIATRY & NEUROLOGY

## 2024-11-11 PROCEDURE — 3077F SYST BP >= 140 MM HG: CPT | Performed by: PSYCHIATRY & NEUROLOGY

## 2024-11-11 PROCEDURE — 3079F DIAST BP 80-89 MM HG: CPT | Performed by: PSYCHIATRY & NEUROLOGY

## 2024-11-11 PROCEDURE — G2211 COMPLEX E/M VISIT ADD ON: HCPCS | Performed by: PSYCHIATRY & NEUROLOGY

## 2024-11-11 PROCEDURE — 1160F RVW MEDS BY RX/DR IN RCRD: CPT | Performed by: PSYCHIATRY & NEUROLOGY

## 2024-11-11 PROCEDURE — 99202 OFFICE O/P NEW SF 15 MIN: CPT | Performed by: PSYCHIATRY & NEUROLOGY

## 2024-11-11 NOTE — PROGRESS NOTES
Patient: William Philip    [id number]  : 1950 -- AGE 74 y.o.    Provider: Jamie Rizzo MD     [facility]   Service Date: 2024              [facility]  Follow-up Note        HPI: William Philip is a 74 y.o. male with history of ALIRIO on CPAP. He is here today for annual follow up visit. He was last seen on 23 by my colleague, DEJA Mejia-MIRA, at which time he was doing well with CPAP.    He is accompanied by his wife.    Using CPAP nightly. Sleeps in a separate room from his wife. Doing very well with CPAP.    Despite his significant weight loss over the years compared to when he was first tested for sleep apnea, he continues to derive significant benefit from CPAP and does not want to be retested for sleep apnea.    PAP DEVICE   DME: [facility] in [place]  Setup date: mid   Type: AirSense 11 autoCPAP  Settin-15 cm H2O, ramp starts at 4 cm H2O, autoramp  Finding benefit: yes - better sleep quality and it helps with sinus congestion    MASK  Type: N30i small  Fit: good    Patient is keeping the equipment clean and supplies are being renewed at appropriate intervals.     Prior Sleep studies:   -initial sleep study not available. Pt states he was tested in [place] with Dr. Sy.  -PSG 2016: weight 181 kg, BMI 62.48. Moderate ALIRIO with AHI 22 due almost exclusively to hypopneas. REM AHI 65. Supine AHI 63. SpO2 zan 72%. Occasional PVCs.   -CPAP 2016: weight 181 kg. Tested 4-13 cm H2O. No optimal pressure found, particularly during REM sleep. Recommendation was for autoCPAP 11-20 cm H2O.            SLEEP HABITS   Bed time: 9-9:30 am  Wake time: 630-7 am  Number of awakenings: 1/night to urinate    NIGHTTIME SYMPTOMS:   Ongoing Snoring: unknown  Nocturnal Gasping: no  Nocturnal Choking: no  Pressure intolerance: no  Air hunger: no  Aerophagia: no    DAYTIME SYMPTOMS:  Daytime sleepiness: no, but has fatigue, which he relates to his  liver cirrhosis  Napping: no  Dozing: no  Feeling sleepy while driving: no    REVIEW OF MACHINE DOWNLOAD:   Date Range: last 365 days  Days used: 362/365  Days with Usage >= 4 hrs: 361/365  Average usage days used: 9.1 h  Settin-15 cm H2O, ramp starts at 4 cm H2O, autoramp  95th percentile pressure: 13.6 cmH2O  95th percentile leak: 32 LPM  Residual AHI: 4.6  AI: 2.3  YURIDIA: 0.1  OAI: 0.8      Patient Active Problem List   Diagnosis    Gastroesophageal reflux disease    Benign essential hypertension    Chronic deep vein thrombosis (DVT) of femoral vein of right lower extremity (Multi)    Dermatophytosis of groin and perianal area    Other ascites    Nodule of skin of chest    Intra-abdominal infection    Eczema of both external ears    Elevated triglycerides with high cholesterol    Gastrointestinal bleed    Hereditary hemochromatosis (CMS-HCC)    Hepatic cirrhosis (Multi)    History of bariatric surgery    Inability to attain erection    Low serum iron    Lymphedema    Small bowel obstruction (Multi)    Nephrolithiasis    Obstructive sleep apnea    Orthostatic hypotension    Portal hypertension (Multi)    PUD (peptic ulcer disease)    Pulmonary hypertension (Multi)    S/P insertion of inferior vena caval filter    Sensory disturbance    Tachy-hernan syndrome (Multi)    Ulcer, anastomotic    Urinary incontinence    Vitamin B12 deficiency    Vitamin D deficiency    Encounter for attention to other artificial openings of digestive tract    Recurrent major depressive disorder, remission status unspecified (CMS-HCC)    Tremor of both hands     Past Medical History:   Diagnosis Date    Acute embolism and thrombosis of right femoral vein (Multi) 07/15/2021    Acute deep vein thrombosis (DVT) of femoral vein of right lower extremity    Allergic contact dermatitis due to plants, except food 2014    Poison ivy    Cellulitis of right upper limb 2016    Cellulitis of right upper extremity    Cellulitis,  unspecified 08/08/2016    Cellulitis    Chronic gastrojejunal ulcer without hemorrhage or perforation 04/20/2021    Chronic marginal ulcer    Contusion of left lower leg, initial encounter 05/20/2019    Contusion of left lower leg, initial encounter    Contusion of right lower leg, subsequent encounter 05/22/2019    Contusion of right lower leg, subsequent encounter    Morbid (severe) obesity due to excess calories (Multi) 11/21/2019    Morbid obesity with body mass index of 60.0-69.9 in adult    Neoplasm of unspecified behavior of bone, soft tissue, and skin 08/08/2016    Skin growth    Obesity, unspecified 04/20/2021    Class 1 obesity with serious comorbidity in adult    Other abnormal glucose     Abnormal glucose level    Partial intestinal obstruction, unspecified as to cause (Multi) 02/01/2021    Small bowel obstruction, partial    Personal history of diseases of the skin and subcutaneous tissue 01/09/2020    History of dermatitis    Personal history of diseases of the skin and subcutaneous tissue 01/09/2020    History of dermatitis    Personal history of diseases of the skin and subcutaneous tissue 10/07/2020    History of contact dermatitis    Personal history of other diseases of the respiratory system 12/03/2018    History of acute sinusitis    Personal history of other diseases of the respiratory system 12/04/2017    History of acute sinusitis    Personal history of other diseases of the respiratory system 11/23/2015    History of sinusitis    Personal history of other mental and behavioral disorders 10/12/2021    History of anxiety    Personal history of other specified conditions 04/06/2021    History of abdominal pain    Personal history of other specified conditions 05/06/2020    History of lump of right breast    Xerosis cutis 01/22/2016    Dry skin dermatitis     Past Surgical History:   Procedure Laterality Date    CARPAL TUNNEL RELEASE  04/09/2013    Neuroplasty Decompression Median Nerve At  Carpal Tunnel    IR VENOGRAM HEPATIC  06/07/2022    IR VENOGRAM HEPATIC 6/7/2022 [facility]    KNEE ARTHROPLASTY Right 04/09/2013    Knee Arthroplasty    MR HEAD ANGIO WO IV CONTRAST  01/06/2020    MR HEAD ANGIO WO IV CONTRAST 1/6/2020 [facility]    MR NECK ANGIO WO IV CONTRAST  01/06/2020    MR NECK ANGIO WO IV CONTRAST 1/6/2020 [facility]    OTHER SURGICAL HISTORY  04/09/2013    Choledochectomy    OTHER SURGICAL HISTORY  02/01/2021    Christopher-en-Y gastric bypass    OTHER SURGICAL HISTORY  02/01/2021    Repair Of Paraesophageal Hiatus Hernia    OTHER SURGICAL HISTORY  12/02/2020    Exploratory laparotomy    OTHER SURGICAL HISTORY  10/07/2022    Exploratory laparoscopy    OTHER SURGICAL HISTORY  05/11/2021    Vascular surgical procedure    US GUIDED ABDOMINAL PARACENTESIS  04/15/2022    US GUIDED ABDOMINAL PARACENTESIS 4/15/2022 [facility]    US GUIDED ABDOMINAL PARACENTESIS  05/02/2022    US GUIDED ABDOMINAL PARACENTESIS 5/2/2022 [facility]    US GUIDED ABDOMINAL PARACENTESIS  06/03/2022    US GUIDED ABDOMINAL PARACENTESIS 6/3/2022 [facility]    US GUIDED ABDOMINAL PARACENTESIS  06/27/2022    US GUIDED ABDOMINAL PARACENTESIS 6/27/2022 [facility]    US GUIDED ABDOMINAL PARACENTESIS  01/11/2023    US GUIDED ABDOMINAL PARACENTESIS 1/11/2023 [facility]    US GUIDED ABDOMINAL PARACENTESIS  02/27/2023    US GUIDED ABDOMINAL PARACENTESIS [facility]    US GUIDED ABDOMINAL PARACENTESIS  08/01/2022    US GUIDED ABDOMINAL PARACENTESIS 8/1/2022 [facility]    US GUIDED ABDOMINAL PARACENTESIS  08/30/2022    US GUIDED ABDOMINAL PARACENTESIS 8/30/2022 [facility]    US GUIDED ABDOMINAL PARACENTESIS  11/15/2022    US GUIDED ABDOMINAL PARACENTESIS 11/15/2022 [facility]    US GUIDED ABDOMINAL PARACENTESIS  04/05/2023    US GUIDED ABDOMINAL PARACENTESIS [facility]    US GUIDED ABDOMINAL PARACENTESIS  07/06/2023    US GUIDED ABDOMINAL PARACENTESIS 7/6/2023 [facility]    US  "GUIDED ABDOMINAL PARACENTESIS  08/23/2023    US GUIDED ABDOMINAL PARACENTESIS 8/23/2023 [facility]    US GUIDED ABDOMINAL PARACENTESIS  05/23/2023    US GUIDED ABDOMINAL PARACENTESIS [facility]    US GUIDED ABDOMINAL PARACENTESIS  10/03/2023    US GUIDED ABDOMINAL PARACENTESIS 10/3/2023 [facility]    US GUIDED ABDOMINAL PARACENTESIS  11/13/2023    US GUIDED ABDOMINAL PARACENTESIS 11/13/2023 [facility]    US GUIDED ABDOMINAL PARACENTESIS  12/18/2023    US GUIDED ABDOMINAL PARACENTESIS 12/18/2023 Ganga Millard MD [facility]     Current Outpatient Medications on File Prior to Visit   Medication Sig Dispense Refill    aMILoride (Midamor) 5 mg tablet Take 2 tablets (10 mg) by mouth once daily. 60 tablet 11    CALCIUM CARBONATE-VITAMIN D3 ORAL Take by mouth.      furosemide (Lasix) 20 mg tablet TAKE 1 TABLET(20 MG) BY MOUTH EVERY DAY 90 tablet 3    pantoprazole (ProtoNix) 40 mg EC tablet TAKE ONE TABLET BY MOUTH TWO TIMES A DAY, 30 MINUTES BEFORE BREAKFAST AND AT BEDTIME. 180 tablet 1    pediatric multivitamin (Flintstones Multivitamin) tablet,chewable Chew 1 tablet once daily.      [DISCONTINUED] furosemide (Lasix) 20 mg tablet Take 1 tablet (20 mg) by mouth once daily. 30 tablet 11     No current facility-administered medications on file prior to visit.       PHYSICAL EXAMINATION:   Vitals:    11/11/24 1000   BP: 142/82   BP Location: Left arm   Pulse: 62   SpO2: 97%   Weight: 86.6 kg (191 lb)   Height: 1.549 m (5' 1\")     Body mass index is 36.09 kg/m².  General: Awake. Alert. Comfortable. No apparent distress.   Speech: Normal.  Comprehension: Normal.  Mood: Stable.  Affect: Appropriate.  Pul:         Normal respiratory effort.   Abd:         increased central adiposity  Neuro: Alert, well-oriented. Cranial nerves II-XII grossly normal and symmetric.  Moves all limbs symmetrically with no evidence of significant focal weakness. No abnormal movements noted. Normal gait      ASSESSMENT AND PLAN: Mr. William Philip is a 74 " y.o. male with ALIRIO doing great on CPAP. Despite his significant weight loss (approximately 209 lbs) since his last sleep study, he feels much better with CPAP than if he goes a night without it (which is very rare), and he continues to have a pressure requirement with a 95th %ile pressures of 13.6 cm H2O, suggesting that he still has ALIRIO and still needs CPAP.      #ALIRIO - Patient's sleep apnea is under very good control with CPAP, he is deriving significant subjective benefit from treatment, his compliance is excellent, and mask leak is well controlled overall.  -continue current CPAP settings  -praised pt for success with CPAP  -pt to continue to get supplies as needed and will continue to use CPAP nightly    All of the above was discussed with the patient in detail. He voiced an understanding of the above and was agreeable to proceed further as advised.     20 minutes were spent with the patient plus time spent reviewing the chart, updating the chart as needed, and documenting.     FOLLOW UP: 1 year, sooner if needed

## 2024-11-13 ENCOUNTER — APPOINTMENT (OUTPATIENT)
Dept: PULMONOLOGY | Facility: CLINIC | Age: 74
End: 2024-11-13
Payer: MEDICARE

## 2024-12-02 ENCOUNTER — HOSPITAL ENCOUNTER (OUTPATIENT)
Age: 74
Discharge: HOME | End: 2024-12-02
Payer: OTHER GOVERNMENT

## 2024-12-02 LAB
ALBUMIN SERPL-MCNC: 4.1 G/DL (ref 3.5–5.2)
ALP SERPL-CCNC: 85 U/L (ref 40–129)
ALT SERPL-CCNC: 10 U/L (ref 0–40)
AST SERPL-CCNC: 18 U/L (ref 0–39)
BILIRUB DIRECT SERPL-MCNC: <0.2 MG/DL (ref 0–0.3)
BILIRUB INDIRECT SERPL-MCNC: NORMAL MG/DL (ref 0–1)
BILIRUB SERPL-MCNC: 0.5 MG/DL (ref 0–1.2)
PROT SERPL-MCNC: 7.8 G/DL (ref 6.4–8.3)

## 2024-12-02 PROCEDURE — 36415 COLL VENOUS BLD VENIPUNCTURE: CPT

## 2024-12-02 PROCEDURE — 80076 HEPATIC FUNCTION PANEL: CPT

## 2024-12-03 ENCOUNTER — HOSPITAL ENCOUNTER (OUTPATIENT)
Dept: PULMONOLOGY | Age: 74
Discharge: HOME | End: 2024-12-03
Payer: OTHER GOVERNMENT

## 2024-12-03 DIAGNOSIS — K74.60 HEPATIC CIRRHOSIS, UNSPECIFIED HEPATIC CIRRHOSIS TYPE, UNSPECIFIED WHETHER ASCITES PRESENT (HCC): ICD-10-CM

## 2024-12-03 PROCEDURE — 94060 EVALUATION OF WHEEZING: CPT

## 2024-12-03 NOTE — PROCEDURES
[address] [address]                           PULMONARY FUNCTION      PATIENT NAME: ISAI NAIR                : 1950  MED REC NO: [id number]                        ROOM:   ACCOUNT NO: [id number]                       ADMIT DATE: 2024  PROVIDER: Laura Mosley MD      DATE OF PROCEDURE: 2024    SURGEON:  Laura Mosley MD    REFERRING PHYSICIAN:  AVRIL FREDERICK    The spirometry shows mild reduction in FVC and minimal reduction in FEV1.  The FEV1/FVC is more than the predicted value.    The maximum voluntary ventilation is 55% of the predicted value.    According to Z-score criteria, FVC is outside the area of curve, more than standard deviation of -1.64, whereas the FEV1 and FEV1/FVC are under the area of curve, within standard deviation of -1.64.  After bronchodilator therapy, there is significant improvement seen in the spirometry.    The above study shows mild restrictive ventilatory impairment with improvement after bronchodilator therapy.  Clinical correlation needed.          LAURA MOSLEY MD      D:  2024 10:44:50     T:  2024 10:56:42     ANTONETTE/PHAM  Job #:  [id number]     Doc#:  [id number]

## 2024-12-11 ENCOUNTER — TELEPHONE (OUTPATIENT)
Dept: SLEEP MEDICINE | Facility: CLINIC | Age: 74
End: 2024-12-11
Payer: MEDICARE

## 2024-12-11 NOTE — TELEPHONE ENCOUNTER
"Pt called and stated his \"cpap machine just stopped working last night . \"  I called him back and gave him the number to [facility]  to call and report it.  "

## 2025-01-02 ENCOUNTER — APPOINTMENT (OUTPATIENT)
Dept: RADIOLOGY | Facility: HOSPITAL | Age: 75
End: 2025-01-02
Payer: MEDICARE

## 2025-01-02 ENCOUNTER — HOSPITAL ENCOUNTER (EMERGENCY)
Facility: HOSPITAL | Age: 75
Discharge: HOME | End: 2025-01-02
Payer: MEDICARE

## 2025-01-02 VITALS
OXYGEN SATURATION: 100 % | HEART RATE: 58 BPM | BODY MASS INDEX: 28.25 KG/M2 | WEIGHT: 180 LBS | HEIGHT: 67 IN | DIASTOLIC BLOOD PRESSURE: 64 MMHG | TEMPERATURE: 97.7 F | RESPIRATION RATE: 16 BRPM | SYSTOLIC BLOOD PRESSURE: 116 MMHG

## 2025-01-02 DIAGNOSIS — R60.9 EDEMA, UNSPECIFIED TYPE: ICD-10-CM

## 2025-01-02 DIAGNOSIS — M25.571 ACUTE RIGHT ANKLE PAIN: Primary | ICD-10-CM

## 2025-01-02 PROCEDURE — 73590 X-RAY EXAM OF LOWER LEG: CPT | Mod: RIGHT SIDE | Performed by: RADIOLOGY

## 2025-01-02 PROCEDURE — 99284 EMERGENCY DEPT VISIT MOD MDM: CPT | Mod: 25

## 2025-01-02 PROCEDURE — 93970 EXTREMITY STUDY: CPT

## 2025-01-02 PROCEDURE — 73610 X-RAY EXAM OF ANKLE: CPT | Mod: RT

## 2025-01-02 PROCEDURE — 73590 X-RAY EXAM OF LOWER LEG: CPT | Mod: RT

## 2025-01-02 PROCEDURE — 93971 EXTREMITY STUDY: CPT | Performed by: STUDENT IN AN ORGANIZED HEALTH CARE EDUCATION/TRAINING PROGRAM

## 2025-01-02 PROCEDURE — 73610 X-RAY EXAM OF ANKLE: CPT | Mod: RIGHT SIDE | Performed by: RADIOLOGY

## 2025-01-02 ASSESSMENT — PAIN - FUNCTIONAL ASSESSMENT: PAIN_FUNCTIONAL_ASSESSMENT: 0-10

## 2025-01-02 ASSESSMENT — PAIN SCALES - GENERAL
PAINLEVEL_OUTOF10: 5 - MODERATE PAIN
PAINLEVEL_OUTOF10: 0 - NO PAIN

## 2025-01-02 ASSESSMENT — COLUMBIA-SUICIDE SEVERITY RATING SCALE - C-SSRS
2. HAVE YOU ACTUALLY HAD ANY THOUGHTS OF KILLING YOURSELF?: NO
6. HAVE YOU EVER DONE ANYTHING, STARTED TO DO ANYTHING, OR PREPARED TO DO ANYTHING TO END YOUR LIFE?: NO
1. IN THE PAST MONTH, HAVE YOU WISHED YOU WERE DEAD OR WISHED YOU COULD GO TO SLEEP AND NOT WAKE UP?: NO

## 2025-01-02 ASSESSMENT — PAIN DESCRIPTION - LOCATION: LOCATION: ANKLE

## 2025-01-02 ASSESSMENT — PAIN DESCRIPTION - ORIENTATION: ORIENTATION: RIGHT

## 2025-01-02 NOTE — ED PROVIDER NOTES
HPI   Chief Complaint   Patient presents with    Leg Swelling     BLE edema, right greater than left    Ankle Pain       74-year-old male with past medical history positive for DVT in the right lower extremity in the past had been on Eliquis but then developed GI bleeding, so they put a filter in, but per patient after the blood clot was gone they removed the filter    [facility]    Here with continued swelling and discomfort to his right lower leg over the last 2 weeks  Denies any shortness of breath no lightheaded or dizziness                  Patient History   Past Medical History:   Diagnosis Date    Acute embolism and thrombosis of right femoral vein (Multi) 07/15/2021    Acute deep vein thrombosis (DVT) of femoral vein of right lower extremity    Allergic contact dermatitis due to plants, except food 08/18/2014    Poison ivy    Cellulitis of right upper limb 08/08/2016    Cellulitis of right upper extremity    Cellulitis, unspecified 08/08/2016    Cellulitis    Chronic gastrojejunal ulcer without hemorrhage or perforation 04/20/2021    Chronic marginal ulcer    Contusion of left lower leg, initial encounter 05/20/2019    Contusion of left lower leg, initial encounter    Contusion of right lower leg, subsequent encounter 05/22/2019    Contusion of right lower leg, subsequent encounter    Morbid (severe) obesity due to excess calories (Multi) 11/21/2019    Morbid obesity with body mass index of 60.0-69.9 in adult    Neoplasm of unspecified behavior of bone, soft tissue, and skin 08/08/2016    Skin growth    Obesity, unspecified 04/20/2021    Class 1 obesity with serious comorbidity in adult    Other abnormal glucose     Abnormal glucose level    Partial intestinal obstruction, unspecified as to cause (Multi) 02/01/2021    Small bowel obstruction, partial    Personal history of diseases of the skin and subcutaneous tissue 01/09/2020    History of dermatitis     Personal history of diseases of the skin and subcutaneous tissue 01/09/2020    History of dermatitis    Personal history of diseases of the skin and subcutaneous tissue 10/07/2020    History of contact dermatitis    Personal history of other diseases of the respiratory system 12/03/2018    History of acute sinusitis    Personal history of other diseases of the respiratory system 12/04/2017    History of acute sinusitis    Personal history of other diseases of the respiratory system 11/23/2015    History of sinusitis    Personal history of other mental and behavioral disorders 10/12/2021    History of anxiety    Personal history of other specified conditions 04/06/2021    History of abdominal pain    Personal history of other specified conditions 05/06/2020    History of lump of right breast    Xerosis cutis 01/22/2016    Dry skin dermatitis     Past Surgical History:   Procedure Laterality Date    CARPAL TUNNEL RELEASE  04/09/2013    Neuroplasty Decompression Median Nerve At Carpal Tunnel    IR VENOGRAM HEPATIC  06/07/2022    IR VENOGRAM HEPATIC 6/7/2022 [facility]    KNEE ARTHROPLASTY Right 04/09/2013    Knee Arthroplasty    MR HEAD ANGIO WO IV CONTRAST  01/06/2020    MR HEAD ANGIO WO IV CONTRAST 1/6/2020 [facility]    MR NECK ANGIO WO IV CONTRAST  01/06/2020    MR NECK ANGIO WO IV CONTRAST 1/6/2020 [facility]    OTHER SURGICAL HISTORY  04/09/2013    Choledochectomy    OTHER SURGICAL HISTORY  02/01/2021    Christopher-en-Y gastric bypass    OTHER SURGICAL HISTORY  02/01/2021    Repair Of Paraesophageal Hiatus Hernia    OTHER SURGICAL HISTORY  12/02/2020    Exploratory laparotomy    OTHER SURGICAL HISTORY  10/07/2022    Exploratory laparoscopy    OTHER SURGICAL HISTORY  05/11/2021    Vascular surgical procedure    US GUIDED ABDOMINAL PARACENTESIS  04/15/2022    US GUIDED ABDOMINAL PARACENTESIS 4/15/2022 [facility]ICAL [facility]GAC[facility]    US GUIDED ABDOMINAL PARACENTESIS  05/02/2022    US GUIDED ABDOMINAL  PARACENTESIS 2022 [facility] CL[facility]ICAL LEGACY    US GUIDED ABDOMINAL PARACENTESIS  2022    US GUIDED ABDOMINAL PARACENTESIS 6/3/2022 [facility]ICAL [facility]GAC[facility]    US GUIDED ABDOMINAL PARACENTESIS  2022    US GUIDED ABDOMINAL PARACENTESIS 2022 [facility]    US GUIDED ABDOMINAL PARACENTESIS  2023    US GUIDED ABDOMINAL PARACENTESIS 2023 [facility]    US GUIDED ABDOMINAL PARACENTESIS  2023    US GUIDED ABDOMINAL PARACENTESIS [facility]    US GUIDED ABDOMINAL PARACENTESIS  2022    US GUIDED ABDOMINAL PARACENTESIS 2022 [facility]    US GUIDED ABDOMINAL PARACENTESIS  2022    US GUIDED ABDOMINAL PARACENTESIS 2022 [facility]    US GUIDED ABDOMINAL PARACENTESIS  11/15/2022    US GUIDED ABDOMINAL PARACENTESIS 11/15/2022 [facility]    US GUIDED ABDOMINAL PARACENTESIS  2023    US GUIDED ABDOMINAL PARACENTESIS [facility]    US GUIDED ABDOMINAL PARACENTESIS  2023    US GUIDED ABDOMINAL PARACENTESIS 2023 [facility]    US GUIDED ABDOMINAL PARACENTESIS  2023    US GUIDED ABDOMINAL PARACENTESIS 2023 [facility]    US GUIDED ABDOMINAL PARACENTESIS  2023    US GUIDED ABDOMINAL PARACENTESIS [facility]    US GUIDED ABDOMINAL PARACENTESIS  10/03/2023    US GUIDED ABDOMINAL PARACENTESIS 10/3/2023 [facility]    US GUIDED ABDOMINAL PARACENTESIS  2023    US GUIDED ABDOMINAL PARACENTESIS 2023 [facility]    US GUIDED ABDOMINAL PARACENTESIS  2023    US GUIDED ABDOMINAL PARACENTESIS 2023 Ganga Millard MD [facility]     No family history on file.  Social History     Tobacco Use    Smoking status: Former     Current packs/day: 0.00     Types: Cigarettes     Quit date:      Years since quittin.0    Smokeless tobacco: Never   Substance Use Topics    Alcohol use: Not Currently    Drug use: Never       Physical Exam   ED Triage Vitals [25 1135]   Temperature Heart Rate Respirations BP   36.6 °C (97.8 °F) 50 16 142/70      Pulse Ox  Temp src Heart Rate Source Patient Position   100 % -- -- --      BP Location FiO2 (%)     -- --       Physical Exam  Vitals and nursing note reviewed.   Constitutional:       General: He is not in acute distress.     Appearance: Normal appearance. He is well-developed.   HENT:      Head: Normocephalic and atraumatic.      Right Ear: Ear canal and external ear normal.      Left Ear: Ear canal and external ear normal.      Nose: Nose normal.      Mouth/Throat:      Mouth: Mucous membranes are moist.   Eyes:      Extraocular Movements: Extraocular movements intact.      Conjunctiva/sclera: Conjunctivae normal.      Pupils: Pupils are equal, round, and reactive to light.   Cardiovascular:      Rate and Rhythm: Normal rate and regular rhythm.      Heart sounds: No murmur heard.  Pulmonary:      Effort: Pulmonary effort is normal. No respiratory distress.      Breath sounds: Normal breath sounds.   Abdominal:      Palpations: Abdomen is soft.      Tenderness: There is no abdominal tenderness.   Musculoskeletal:         General: No swelling.      Cervical back: Neck supple.   Skin:     General: Skin is warm and dry.      Capillary Refill: Capillary refill takes less than 2 seconds.      Comments: Patient with chronic lower extremity skin changes right greater than left  Bilateral feet warm pulses palpable equal bilateral lower extremity  Does have slight swelling to the right lower leg compared to the left  With complaints of slight ankle and right lower leg pain       Neurological:      General: No focal deficit present.      Mental Status: He is alert and oriented to person, place, and time.   Psychiatric:         Mood and Affect: Mood normal.           ED Course & MDM   Diagnoses as of 01/02/25 1411   Acute right ankle pain   Edema, unspecified type                 No data recorded     Lisa Coma Scale Score: 15 (01/02/25 1152 : Kaitlyn Matos RN)                           Medical Decision Making  Differential:   1)  DVT right lower leg   2) ankle/lower leg pain/injury   3) chronic vascular deficiency bilateral lower EXTR    74-year-old male with prior history of DVT here with complaints of lower leg swelling and pain at the right ankle and the lower leg denies any specific known injury, does have chronic peripheral vascular skin changes bilateral lower legs right greater than left ultrasound is negative for any acute DVT bilateral will check x-ray, eye redness x-rays is negative for acute fracture it does show degenerative changes especially over the lateral malleolus, but did tell the patient that if radiology read something differently I will contact him   but also discussed with patient that the pain could be secondary to swelling edema possibly doing compression hose will help with some of the edema   Plan: Discussed differential with the patient and /or parents family   Patient to  follow up with the PCP in the next 2-3 days  Return for any worsening symptoms or go to the ER for further evaluation. Patient/family/caregiver  understands return   precautions and discharge instructions and is agreeable to the current plan   Impression: Lower extremity edema, right ankle pain        Orders and Diagnoses for this visit    ADDENDUM NOTE   X-ray read negative fractures to the right tib-fib and ankle      Labs Reviewed - No data to display  XR ankle right 3+ views   Final Result    No acute bony abnormality right tibia and fibula and right ankle.                MACRO:    None          Signed by: Jaci Holland 1/2/2025 2:42 PM    Dictation workstation:   [id number]     XR tibia fibula right 2 views   Final Result    No acute bony abnormality right tibia and fibula and right ankle.                MACRO:    None          Signed by: Jaci Holland 1/2/2025 2:42 PM    Dictation workstation:   [id number]     Vascular US Lower Extremity Venous Duplex Bilateral   Final Result    RIGHT: partial compressibility of the mid femoral vein  which is    similar to prior study likely representing chronic post thrombotic    changes. This results in overall luminal narrowing at baseline. There    is no evidence for new occlusive thrombus.          LEFT:  No evidence of acute DVT.          MACRO:    None.          Signed by: Bruno Clarke 1/2/2025 1:02 PM    Dictation workstation:   [id number]         Amount and/or Complexity of Data Reviewed  Radiology: independent interpretation performed.     Details: No acute fracture to the right hip/fib, and right ankle  Show degenerative changes        Procedure  Procedures     Elke Benito, DURAN  01/02/25 1304       Elke GARCIA Page, DURAN  01/02/25 1328       Elke GARCIA Page, DURAN  01/02/25 1412       Elke L Page, DURAN  01/02/25 1413       Elke GARCIA Page, DURAN  01/02/25 1442

## 2025-01-02 NOTE — ED TRIAGE NOTES
[facility] being sent for evaluation for DVT. Patient has been off anticoagulation for greater than a year secondary to GI Bleed. Patient does c/o right lateral ankle pain with no known injury. Lower extremity edema bilaterally, greater on right.

## 2025-01-02 NOTE — DISCHARGE INSTRUCTIONS
Your ultrasound was negative for blood clot/DVT    [facility] and treatment of your right lower leg ankle pain  I read the x-rays as no fracture there does show some degenerative changes  If radiology read something differently I will call you    Continue to use compression devices

## 2025-01-12 DIAGNOSIS — R18.8 OTHER ASCITES: ICD-10-CM

## 2025-01-13 RX ORDER — FUROSEMIDE 20 MG/1
20 TABLET ORAL DAILY
Qty: 90 TABLET | Refills: 1 | Status: SHIPPED | OUTPATIENT
Start: 2025-01-13

## 2025-01-13 NOTE — TELEPHONE ENCOUNTER
Spoke to patients wife reminded her that patient is over due to see Dr Gaspar provided scheduling number.

## 2025-01-26 DIAGNOSIS — R18.8 OTHER ASCITES: ICD-10-CM

## 2025-01-27 DIAGNOSIS — R18.8 OTHER ASCITES: ICD-10-CM

## 2025-01-27 RX ORDER — FUROSEMIDE 20 MG/1
20 TABLET ORAL DAILY
Qty: 90 TABLET | Refills: 3 | Status: SHIPPED | OUTPATIENT
Start: 2025-01-27

## 2025-02-17 DIAGNOSIS — K21.9 GASTROESOPHAGEAL REFLUX DISEASE, UNSPECIFIED WHETHER ESOPHAGITIS PRESENT: ICD-10-CM

## 2025-02-17 RX ORDER — PANTOPRAZOLE SODIUM 40 MG/1
TABLET, DELAYED RELEASE ORAL
Qty: 180 TABLET | Refills: 1 | Status: SHIPPED | OUTPATIENT
Start: 2025-02-17

## 2025-03-20 DIAGNOSIS — R18.8 OTHER ASCITES: ICD-10-CM

## 2025-03-20 DIAGNOSIS — K74.60 CIRRHOSIS OF LIVER WITH ASCITES, UNSPECIFIED HEPATIC CIRRHOSIS TYPE (MULTI): ICD-10-CM

## 2025-03-20 DIAGNOSIS — R18.8 CIRRHOSIS OF LIVER WITH ASCITES, UNSPECIFIED HEPATIC CIRRHOSIS TYPE (MULTI): ICD-10-CM

## 2025-03-24 ENCOUNTER — HOSPITAL ENCOUNTER (OUTPATIENT)
Dept: RADIOLOGY | Facility: HOSPITAL | Age: 75
Discharge: HOME | End: 2025-03-24
Payer: MEDICARE

## 2025-03-24 VITALS — DIASTOLIC BLOOD PRESSURE: 73 MMHG | SYSTOLIC BLOOD PRESSURE: 140 MMHG | HEART RATE: 54 BPM | RESPIRATION RATE: 18 BRPM

## 2025-03-24 DIAGNOSIS — K74.60 CIRRHOSIS OF LIVER WITH ASCITES, UNSPECIFIED HEPATIC CIRRHOSIS TYPE (MULTI): ICD-10-CM

## 2025-03-24 DIAGNOSIS — R18.8 OTHER ASCITES: ICD-10-CM

## 2025-03-24 DIAGNOSIS — R18.8 CIRRHOSIS OF LIVER WITH ASCITES, UNSPECIFIED HEPATIC CIRRHOSIS TYPE (MULTI): ICD-10-CM

## 2025-03-24 PROCEDURE — 2720000007 HC OR 272 NO HCPCS

## 2025-03-24 PROCEDURE — 49083 ABD PARACENTESIS W/IMAGING: CPT

## 2025-03-24 ASSESSMENT — PAIN - FUNCTIONAL ASSESSMENT: PAIN_FUNCTIONAL_ASSESSMENT: 0-10

## 2025-03-25 ENCOUNTER — APPOINTMENT (OUTPATIENT)
Dept: PRIMARY CARE | Facility: CLINIC | Age: 75
End: 2025-03-25
Payer: MEDICARE

## 2025-03-25 ENCOUNTER — APPOINTMENT (OUTPATIENT)
Dept: GASTROENTEROLOGY | Facility: CLINIC | Age: 75
End: 2025-03-25
Payer: MEDICARE

## 2025-03-25 VITALS
TEMPERATURE: 97.4 F | HEART RATE: 61 BPM | DIASTOLIC BLOOD PRESSURE: 66 MMHG | SYSTOLIC BLOOD PRESSURE: 124 MMHG | BODY MASS INDEX: 30.07 KG/M2 | WEIGHT: 192 LBS | OXYGEN SATURATION: 100 %

## 2025-03-25 DIAGNOSIS — Z43.4 ENCOUNTER FOR ATTENTION TO OTHER ARTIFICIAL OPENINGS OF DIGESTIVE TRACT: ICD-10-CM

## 2025-03-25 DIAGNOSIS — F32.A DEPRESSION, UNSPECIFIED DEPRESSION TYPE: ICD-10-CM

## 2025-03-25 DIAGNOSIS — I49.5 TACHY-BRADY SYNDROME (MULTI): ICD-10-CM
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
## 2025-03-25 DIAGNOSIS — E55.9 VITAMIN D DEFICIENCY, UNSPECIFIED: ICD-10-CM

## 2025-03-25 DIAGNOSIS — E78.00 HYPERCHOLESTEREMIA: ICD-10-CM

## 2025-03-25 DIAGNOSIS — I10 HYPERTENSION, UNSPECIFIED TYPE: ICD-10-CM

## 2025-03-25 DIAGNOSIS — I27.20 PULMONARY HYPERTENSION (MULTI): ICD-10-CM

## 2025-03-25 DIAGNOSIS — E53.8 VITAMIN B12 DEFICIENCY: ICD-10-CM

## 2025-03-25 DIAGNOSIS — K76.6 PORTAL HYPERTENSION (MULTI): ICD-10-CM

## 2025-03-25 PROCEDURE — 1036F TOBACCO NON-USER: CPT | Performed by: INTERNAL MEDICINE

## 2025-03-25 PROCEDURE — 3078F DIAST BP <80 MM HG: CPT | Performed by: INTERNAL MEDICINE

## 2025-03-25 PROCEDURE — 3074F SYST BP LT 130 MM HG: CPT | Performed by: INTERNAL MEDICINE

## 2025-03-25 PROCEDURE — 99214 OFFICE O/P EST MOD 30 MIN: CPT | Performed by: INTERNAL MEDICINE

## 2025-03-25 PROCEDURE — 1159F MED LIST DOCD IN RCRD: CPT | Performed by: INTERNAL MEDICINE

## 2025-03-25 PROCEDURE — 1160F RVW MEDS BY RX/DR IN RCRD: CPT | Performed by: INTERNAL MEDICINE

## 2025-03-25 PROCEDURE — G2211 COMPLEX E/M VISIT ADD ON: HCPCS | Performed by: INTERNAL MEDICINE

## 2025-03-25 ASSESSMENT — ENCOUNTER SYMPTOMS
ARTHRALGIAS: 0
DIFFICULTY URINATING: 0
HEADACHES: 0
ABDOMINAL PAIN: 0
DIZZINESS: 0
SORE THROAT: 0
FEVER: 0
FATIGUE: 0
BLOOD IN STOOL: 0
BRUISES/BLEEDS EASILY: 0
UNEXPECTED WEIGHT CHANGE: 0
COUGH: 0
WOUND: 1
PALPITATIONS: 0
SINUS PAIN: 0
WHEEZING: 0
DIARRHEA: 0
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
## 2025-05-02 ENCOUNTER — TELEPHONE (OUTPATIENT)
Dept: GASTROENTEROLOGY | Facility: HOSPITAL | Age: 75
End: 2025-05-02
Payer: MEDICARE

## 2025-05-02 DIAGNOSIS — R18.8 OTHER ASCITES: ICD-10-CM

## 2025-05-02 RX ORDER — FUROSEMIDE 20 MG/1
20 TABLET ORAL DAILY
Qty: 90 TABLET | Refills: 3 | Status: SHIPPED | OUTPATIENT
Start: 2025-05-02

## 2025-05-06 ENCOUNTER — OFFICE VISIT (OUTPATIENT)
Dept: GASTROENTEROLOGY | Facility: CLINIC | Age: 75
End: 2025-05-06
Payer: MEDICARE

## 2025-05-06 VITALS
HEIGHT: 67 IN | SYSTOLIC BLOOD PRESSURE: 148 MMHG | WEIGHT: 198.9 LBS | OXYGEN SATURATION: 100 % | BODY MASS INDEX: 31.22 KG/M2 | HEART RATE: 52 BPM | DIASTOLIC BLOOD PRESSURE: 67 MMHG

## 2025-05-06 DIAGNOSIS — K76.6 PORTAL HYPERTENSION (MULTI): ICD-10-CM

## 2025-05-06 DIAGNOSIS — R18.8 CIRRHOSIS OF LIVER WITH ASCITES, UNSPECIFIED HEPATIC CIRRHOSIS TYPE (MULTI): ICD-10-CM

## 2025-05-06 DIAGNOSIS — K74.60 CIRRHOSIS OF LIVER WITH ASCITES, UNSPECIFIED HEPATIC CIRRHOSIS TYPE (MULTI): ICD-10-CM

## 2025-05-06 PROCEDURE — 1126F AMNT PAIN NOTED NONE PRSNT: CPT | Performed by: INTERNAL MEDICINE

## 2025-05-06 PROCEDURE — 3078F DIAST BP <80 MM HG: CPT | Performed by: INTERNAL MEDICINE

## 2025-05-06 PROCEDURE — 99215 OFFICE O/P EST HI 40 MIN: CPT | Performed by: INTERNAL MEDICINE

## 2025-05-06 PROCEDURE — 3077F SYST BP >= 140 MM HG: CPT | Performed by: INTERNAL MEDICINE

## 2025-05-06 ASSESSMENT — PATIENT HEALTH QUESTIONNAIRE - PHQ9
SUM OF ALL RESPONSES TO PHQ9 QUESTIONS 1 AND 2: 0
2. FEELING DOWN, DEPRESSED OR HOPELESS: NOT AT ALL
1. LITTLE INTEREST OR PLEASURE IN DOING THINGS: NOT AT ALL

## 2025-05-06 ASSESSMENT — ENCOUNTER SYMPTOMS
OCCASIONAL FEELINGS OF UNSTEADINESS: 0
LOSS OF SENSATION IN FEET: 0
DEPRESSION: 0

## 2025-05-06 ASSESSMENT — PAIN SCALES - GENERAL: PAINLEVEL_OUTOF10: 0-NO PAIN

## 2025-05-06 NOTE — PROGRESS NOTES
HEPATOLOGY RETURN PATIENT VISIT    May 6, 2025    Dr. Xiomara Khan       Patient Name:   KEVEN RAYMUNDO  Medical Record Number: [id number]  YOB: 1950    Dear Dr. Khan,    I had the pleasure of seeing Keven Raymundo (along with his daughter) for follow-up evaluation in the [facility] ([facility] office). History and physical examination was performed. Pertinent available laboratory, imaging, pathology results were reviewed.  I spent over 20 minutes reviewing his chart in preparation for this visit.    History of Present Illness:   The patient is a 74 year old white male who is referred for follow-up evaluation of ascites and a concern for cirrhosis and possible noncirrhotic portal hypertension and compound heterozygote for hemochromatosis.    The patient has an extremely extensive past medical history as noted below.  He then developed the onset of high-protein low SAAG ascites.  He was seen by someone in the gastroenterology department and referred to me for further evaluation.  I suspect that the concern was determining whether he definitively had cirrhosis or not.    The patient had undergone surgery in January 2018 for bariatric surgery and refractory GERD and a large paraesophageal hernia.  He was found to have a large hiatal hernia with incarcerated transverse colon and greater omentum as well as a liver that grossly appeared to be cirrhotic.  He underwent laparoscopic reduction of the hernia, laparoscopic Christopher-en-Y gastric bypass, and intraoperative liver biopsy.  Interestingly, the biopsy only showed mild fibrosis.    After his Christopher-en-Y surgery, he lost a significant amount of weight.  His maximum weight was 430 pounds.  However, for the last year, he has weighed between 180 pounds and 190 pounds.  He did have some surgical issues including anastomotic ulcer and an internal hernia that required surgery.      He did not have any ascites immediately after  surgery or for the next few years.  However, starting in 4/2022, he began to have onset of ascites along with nausea and abdominal discomfort.  He underwent a CAT scan that showed large amount of ascites and possible nodularity to the liver.  He was admitted to the hospital.  He was seen by one of the gastroenterology APN's (Ameya) who recommended additional work-up and diuretics.  He underwent a 5.7 L paracentesis on 4/15/2022.  There was a concern for possible adhesions and small bowel obstruction which was treated conservatively.  There was no surgical intervention deemed necessary.  The patient was discharged 4/16/2022 on Lasix 20 mg daily and told to make an appointment with hepatology.    He was again admitted to the hospital 4/30/2022 with lower abdominal pain.  Imaging studies again showed dilated loops of small bowel and large ascites. He underwent a 2.94 L paracentesis on 5/2/2022.  He was again seen by Ms. Hou in GI who felt that he had malignant ascites.  He was again sent out on Lasix 20 mg daily. He was again seen by Ms. Hou on 5/24/2022 and was told to remain on Lasix 20 mg daily as well as Aldactone 50 mg daily.  He noticed a good increase in his urine output after being placed on those diuretics.  He is following a low-salt diet.    It was not felt that the ascites was chylous.  The patient reports that the ascites is clear and light yellow in appearance.  He was also seen by his bariatric surgery clinic who recommended that he come see us for the ascites.    The patient had risk factors for fatty liver disease including morbid obesity.  Obviously, he underwent the definitive therapy with bariatric surgery.    The patient denied any past history of acute hepatitis or jaundice.      He has never had any jaundice, hepatic encephalopathy, or variceal bleeding.     He does have significant bilateral lower extremity edema but reports that this is chronic.  He has actually had an IVC filter placed for  DVT and peripheral chronic edema in the right lower extremity because he could not tolerate Eliquis due to bleeding.    He initially saw me in consultation 5/26/2022.  At that time, I did additional evaluation (see results below).  At that time, I suggested echocardiogram, liver biopsy, low-sodium diet, continuing diuretics, and see me back in clinic after the biopsy was done.    He underwent transjugular liver biopsy 6/7/2022.  There was an IVC filter in place but no evidence of clot adherent to that.  The right atrial pressure was 5 mmHg.  The free hepatic venous pressure was 6 mmHg and the wedge pressure was 22 mmHg.  The hepatic venous pressure gradient was 16 mmHg.  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    He underwent a 5 L paracentesis on 6/3/2022.  He underwent a 2.1 L paracentesis on 6/27/2022.    He was seen in the emergency room 7/25/2022 for right lower quadrant pain of 1 day duration.  This was a new pain for him.  It was worse when he laid still and better when he moved around.  He was given pain medication with improvement.  He was also found to have a kidney stone on the ER CAT scan.  He was also found to be positive for COVID-19.    He underwent a 4 L paracentesis on 8/1/2022.  He underwent a 4.8 L paracentesis on 8/30/2022.    He was seen by his cardiologist who removed his IVC filter in early September 2022.  He thinks that his peripheral edema may be slightly better since then.    He underwent diagnostic laparoscopy with laparoscopic peritoneal biopsy, laparoscopic core needle biopsy, and paracentesis with Dr. Parikh on 10/3/2022.  The peritoneum showed some exudate.  The biopsy just showed nonspecific inflammation.  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    When I saw him in clinic in  November 2022, he was building up some fluid.  I adjusted his diuretics to Lasix 40 mg daily and Aldactone 50 mg daily.    He has been having episodes of dizziness and has been seen in the emergency room where he was found to be orthostatic.  He was also seen by neurology.  At some point, it was felt that his diuretics were contributing to his symptoms and his furosemide was stopped.  He was just maintained on spironolactone 50 mg daily at that point.    He continues to get paracentesis about every 4 to 6 weeks.  He underwent a 5.1 L paracentesis on 11/15/2022.  He underwent a 5.5 L paracentesis on 1/11/2023.  He underwent a 3.5 L paracentesis on 2/27/2023.  He underwent a 4.9 L paracentesis on 4/5/2023.    When I saw him in clinic on 5/10/2023, I increased his spironolactone to 75 mg daily.    He underwent a 3 L paracentesis on 7/6/2023.  He underwent a 3.4 L paracentesis on 8/23/2023.  He underwent a 3.5 L paracentesis on 10/3/2023.    He continued to get paracentesis approximately every 2 to 3 months.  He was then taking spironolactone 100 mg daily, but no furosemide.    He underwent a 1.8 L paracentesis on 12/18/2023.  He underwent a 1.7 L paracentesis on 1/29/2024. He underwent a 1 L paracentesis on 4/9/2024.      Due to gynecomastia, I eventually switched his Aldactone to amiloride 10 mg daily.    He underwent a 1.9 L paracentesis on 3/24/2025.    He presented today for follow-up evaluation.  He denied any specific liver-related complaints.  As noted above, he is on amiloride 10 mg daily but no furosemide.  He is trying to follow a low-sodium diet.  He is trying to avoid artificial salt substitutes.  He also eats one banana a day.  He denied any GI bleeding.  He does have quite significant fatigue.  He has also been having anxiety.  His breast tenderness has resolved. He has no orthostatic symptoms. He gets lower extremity edema.    Past Medical/Surgical History:   ? Obstructive sleep apnea (327.23)  (G47.33)  ? Obesity (BMI 30-39.9) (278.00) (E66.9)  ? Abdominal distention (787.3) (R14.0)  ? Actinic keratosis (702.0) (L57.0)  ? Bariatric surgery status (V45.86) (Z98.84)  ? Benign essential HTN (401.1) (I10)  ? Benign essential hypertension (401.1) (I10)  ? Chronic deep vein thrombosis (DVT) of femoral vein of right lower extremity (453.51)  ? Chronic fatigue (780.79) (R53.82)  ? Class 1 obesity with body mass index (BMI) of 30.0 to 30.9 in adult (278.00,V85.30)  ? Dermatophytosis of groin and perianal area (110.3) (B35.6)  ? DVT prophylaxis (V07.9)  ? Eczema of both external ears (380.22) (H60.543)  ? Elevated triglycerides with high cholesterol (272.2) (E78.2)  ? Encounter for immunization (V03.89) (Z23)  ? Encounter for Medicare annual wellness exam (V70.0) (Z00.00)  ? Encounter for prostate cancer screening (V76.44) (Z12.5)  ? Gastroesophageal reflux disease (530.81) (K21.9)  ? Gastrointestinal bleed (578.9) (K92.2)  ? H/O bariatric surgery (V45.86) (Z98.84)  ? H/O vasomotor rhinitis (V12.69) (Z87.09)  ? Hepatic cirrhosis, unspecified hepatic cirrhosis type, unspecified whether ascites present  ? Hernia, hiatal (553.3) (K44.9)  ? History of elevated glucose (V12.29) (Z86.39)  ? Hypertension, uncontrolled (401.9) (I10)  ? Inability to attain erection (302.72) (N52.9)  ? Intra-abdominal infection (136.9) (B99.9)  ? Low serum iron (280.9) (E61.1)  ? Lymphedema (457.1) (I89.0)  ? Male erectile disorder (607.84) (N52.9)  ? Malnutrition following gastrointestinal surgery (579.3) (K91.2)  ? Medicare annual wellness visit, subsequent (V70.0) (Z00.00)  ? Near syncope (780.2) (R55)  ? Nodule of skin of chest (782.2) (R22.2)  ? Obesity (BMI 30-39.9) (278.00) (E66.9)  ? Obstructive sleep apnea (327.23) (G47.33)  ? Open wound of right lower extremity, sequela (906.1) (S81.801S)  ? Other long term (current) drug therapy (V58.69) (Z79.899)  ? Post-operative pain (338.18) (G89.18)  ? Preop cardiovascular exam (V72.81)  (Z01.810)  ? Preoperative clearance (V72.84) (Z01.818)  ? PUD (peptic ulcer disease) (533.90) (K27.9)  ? Pulmonary hypertension (416.8) (I27.20)  ? Refractory ascites (789.59) (R18.8)  ? S/P insertion of inferior vena caval filter (V45.89) (Z95.828)  ? Screen for colon cancer (V76.51) (Z12.11)  ? Screening for colon cancer (V76.51) (Z12.11)  ? Skin growth (239.2) (D49.2)  ? Skin irritation (709.9) (R23.8)  ? Small bowel obstruction (560.9) (K56.609)  ? SOB (shortness of breath) on exertion (786.05) (R06.02)  ? Spasm of abdominal muscles (728.85) (M62.838)  ? Swelling of both lower extremities (729.81) (M79.89)  ? 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 Tetanus, diphtheria, and acellular pertussis (Tdap) vaccination declined (V64.06)  ? Ulcer, anastomotic (534.90) (K28.9)  ? Urinary incontinence (788.30) (R32)  ? Venous stasis ulcer (454.0) (I83.009,L97.909)  ? Vitamin B12 deficiency (266.2) (E53.8)  ? Vitamin D deficiency (268.9) (E55.9)  ? Wound of right lower extremity, initial encounter (894.0) (S81.801A)  ? History of Abnormal glucose level (790.29) (R73.09)  ? Resolved Date: 11 Nov 2021  ? History of Acute deep vein thrombosis (DVT) of femoral vein of right lower extremity  ? Resolved Date: 11 Nov 2021  ? History of Cellulitis (682.9) (L03.90)  ? Resolved Date: 08 Nov 2016  ? History of Cellulitis of right upper extremity (682.3) (L03.113)  ? Resolved Date: 08 Nov 2016  ? History of Chronic marginal ulcer (534.70) (K28.7)  ? Resolved Date: 11 Nov 2021  ? History of Class 1 obesity with serious comorbidity in adult (278.00) (E66.9)  ? Resolved Date: 11 Nov 2021  ? History of Contusion of left lower leg, initial encounter (924.10) (S80.12XA)  ? Resolved Date: 11 Nov 2021  ? History of Contusion of right lower leg, subsequent encounter (V58.89,924.10)  ? Resolved Date: 11 Nov 2021  ? History of Dry skin dermatitis (692.89) (L85.3)  ? Resolved Date: 22 Apr 2016  ? History of abdominal pain (V13.89) (Z87.898)  ?  Resolved Date: 2021  ? History of acute sinusitis (V12.69) (Z87.09)  ? Resolved Date: 03 Dec 2018  ? History of acute sinusitis (V12.69) (Z87.09)  ? Resolved Date: 2021  ? History of anxiety (V11.8) (Z86.59)  ? Resolved Date: 2021  ? History of contact dermatitis (V13.3) (Z87.2)  ? Resolved Date: 2021  ? History of dermatitis (V13.3) (Z87.2)  ? Resolved Date: 2021  ? History of dermatitis (V13.3) (Z87.2)  ? Resolved Date: 2020  ? History of lump of right breast (V13.89) (Z87.898)  ? Resolved Date: 2021  ? History of sinusitis (V12.69) (Z87.09)  ? Resolved Date: 2016  ? History of Morbid obesity with body mass index of 60.0-69.9 in adult (278.01,V85.44)  (E66.01,Z68.44)  ? Resolved Date: 2020  ? History of Poison ivy (692.6) (L23.7)  ? Resolved Date: 17 Mar 2015  ? History of Skin growth (239.2) (D49.2)  ? Resolved Date: 2016  ? History of Small bowel obstruction, partial (560.9) (K56.600)  ? Resolved Date: 2021  ? History of Choledochectomy  ? History of Exploratory laparoscopy  ? 2020 - perforated marginal ulcer - Paul patch repair - Surgeon: Axel Simpson MD - [facility] - [phone]  ? History of Exploratory laparotomy  ? 2020 - 2020 - intestinal obstruction, adhesiolysis, laparoscopy converted to  open laparotomy - Surgeon: Axel Simpson MD - [facility]  ? History of Knee Arthroplasty  ? History of Neuroplasty Decompression Median Nerve At Carpal Tunnel  ? History of Repair Of Paraesophageal Hiatus Hernia  ? History of Christopher-en-Y gastric bypass  ? 2018 w/Dr. Mccormick  ? History of Vascular surgical procedure  ? Right IVC  COVID-19    Family History:   ? Family history of Lung Cancer (V16.1)  ? Family history of Thyroid Cancer  ? Family history of Diabetes Mellitus (V18.0)  ? Family history of Obesity  ? Family history of Stroke Syndrome (V17.1)  ? Family history of Alcohol Abuse  Mother   of lung cancer.  Father  of prostate cancer.  There is no known family history of liver disease.    Social History:   He is a former smoker.  He is .  He is retired.  He is a social drinker, but none recently.  He denied tattoos.  He denied blood transfusions.  He denied intravenous drug use.    Review of systems: As noted above.  He has anxiety.  He no longer has breast tenderness.  He does have chronic peripheral edema, right greater than left.  It might be slightly better since having the IVC filter removed.  He does have fatigue.  He does have a low heart rate at times.  He does get nausea and abdominal pain when the ascites is large, but not currently.  He does have dry itchy skin.  He does have issues with anxiety.  He does have weakness.  He has easy bruising.  He has erectile dysfunction.  His weight has been stable.  No fever, chills, weight loss, visual changes, auditory changes, shortness of breath, chest pain, GI bleeding, diarrhea, constipation, depression, dysuria, hematuria, musculoskeletal issues, or rash.       Medical, Surgical, Family, and Social Histories  Medical History[1]    Surgical History[2]    Family History[3]    Social History     Socioeconomic History    Marital status:      Spouse name: Not on file    Number of children: Not on file    Years of education: Not on file    Highest education level: Not on file   Occupational History    Not on file   Tobacco Use    Smoking status: Former     Current packs/day: 0.00     Types: Cigarettes     Quit date:      Years since quittin.3    Smokeless tobacco: Never   Substance and Sexual Activity    Alcohol use: Not Currently    Drug use: Never    Sexual activity: Not on file   Other Topics Concern    Not on file   Social History Narrative    Not on file     Social Drivers of Health     Financial Resource Strain: Not on file   Food Insecurity: Not on file   Transportation Needs: Not on file   Physical Activity: Not on file  "  Stress: Not on file   Social Connections: Not on file   Intimate Partner Violence: Not on file   Housing Stability: Not on file       Allergies and Current Medications  Allergies[4]  Current Medications[5]     Physical Exam  /67 (BP Location: Left arm, Patient Position: Sitting)   Pulse 52   Ht 1.702 m (5' 7\")   Wt 90.2 kg (198 lb 14.4 oz)   SpO2 100%   BMI 31.15 kg/m²       Physical Examination:   General Appearance: alert and in no acute distress.  He does have significant bitemporal wasting.  HEENT: oropharynx without lesions. Anicteric sclerae.   Neck supple, nontender, without adenopathy, thyromegaly, or JVD.   Lungs clear to auscultation and percussion.   Heart RRR without murmurs, rubs, or gallops.   Abdomen: Soft, nontender, bowel sounds positive, with minimal ascites. Liver and spleen not palpable.  He does have scars from previous surgeries.  Extremities full ROM, no atrophy, normal strength.  He has 2+ right lower extremity edema with chronic venous stasis changes.  He has 2+ left lower extremity edema with chronic venous stasis changes.  Skin no specific lesions.   Neurological exam nonfocal, alert and oriented.  No asterixis.    He does have a few spider angiomata, as well as mild bilateral palmar erythema.  Chest: Mild gynecomastia bilaterally with minimal tenderness.    Labs 3/27/2025 AFP <2, protein 6.9, albumin 3.7, alk phos 79, bilirubin 0.6, AST 16, ALT 10, sodium 139, potassium 5, creatinine 0.96, WBC 6.7, hemoglobin 14.5, platelets 211.    Labs 12/2/2024 protein 7.8, albumin 4.1, alk phos 85, bilirubin 0.5, AST 18, ALT 10.    Labs 6/10/2024 hepatitis B core antibody negative, AFP < 4, iron saturation 31%, ferritin 275.    Labs 3/4/2024 INR 1, protein 7.7, albumin 4.2, alk phos 72, bilirubin 0.6, AST 25, ALT 15, WBC 4.3, hemoglobin 14.2, , platelets 215, glucose 78, sodium 136, creatinine 1.11.    Labs 11/14/2023 hepatitis B surface antibody negative, ferritin 338, AFP < " 4.    Labs 10/16/2023 TSH 3.63.    Labs 9/11/2023 glucose 78, sodium 138, creatinine 0.89, protein 6.2, albumin 3.2, alk phos 65, bilirubin 0.4, AST 17, ALT 11, WBC 6.5, hemoglobin 13.1, platelets 218, INR 1.1.    Labs 5/10/2023 AFP < 4.    Labs 2/11/2023 WBC 4.8, hemoglobin 14.1, platelets 232, INR 1.1, ferritin 425, iron saturation 44%, glucose 56, sodium 137, potassium 3.9, creatinine 0.91, protein 7.1, albumin 3.6, alk phos 88, bilirubin 0.5, AST 20, ALT 12.    Labs 1/3/2023 ammonia 28.    Labs 10/21/2022 WBC 3.9, hemoglobin 14.1, , platelets 61, INR 1.2, glucose 62, sodium 137, potassium 4.4, creatinine 0.77, protein 6.1, albumin 3.1, alk phos 63, bilirubin 0.5, AST 20, ALT 9.    Labs 10/6/2022 platelets 200.    Ascites 10/3/2022 fungal culture negative so far, culture negative, fungal culture negative so far    Labs 9/9/2022 genetic hemochromatosis test reveals C282Y/H63D compound heterozygote, TSH 3.57.    Labs 8/26/2022 WBC 5.4, hemoglobin 12.7, , platelets 236, glucose 75, sodium 138, creatinine 0.69.    Labs 7/25/2022 lactate 0.9, WBC 7, hemoglobin 13.3, platelets 235, lipase 9, INR 1, troponin negative, glucose 88, sodium 135, creatinine 0.88, protein 6.9, albumin 3.7, alk phos 58, bilirubin 0.7, AST 15, ALT 7, COVID-19 PCR positive.    Labs 6/4/2022 WBC 5.4, hemoglobin 14.7, , platelets 216, INR 1.1, protein 6.5, albumin 3.5, alk phos 54, bilirubin 0.8, AST 13, ALT 6, glucose 97, sodium 139, potassium 4, creatinine 0.83.    Labs 5/27/2022 T spot negative.    Labs 5/3/2022 WBC 4.6, Hgb 12.6, platelets 200, INR 1.1, glucose 81, sodium 139, creat 0.79, protein 5.8, albumin 3, alk phos 48, bili 0.5, AST 14, ALT 7.    Ascites 5/2/2022 protein 4.6, glucose 90, cytology negative for malignancy, albumin 2.5, AFB culture negative, fungal culture negative, standard bacterial culture negative.    Labs 4/15/2022 ferritin 314, iron sat 25%, AFP < 4, HBsAg -, HCV -, BLANK -, ceruloplasmin 25, AMA -,  SMA -, A1AT phenotype MM, HCV-RNA negative.    Ascites 4/15/2022 cytology negative for malignancy, albumin 2.5.    Labs 2/21/2021 HBsAb -, HAV +.    US 6/10/2024:  IMPRESSION:  Small abdominal ascites.      Ultrasound 11/24/2023:  IMPRESSION:  Cirrhotic liver morphology without focal lesion. Mild ascites.      CAT scan with contrast 7/25/2022:  IMPRESSION:  1. 4 mm obstructing calculus in the right distal ureter with minimal  hydronephrosis.  2. Extensive ascites.  3. Possible cirrhosis.  4. Prostatomegaly.    Echocardiogram 6/1/2022:  CONCLUSIONS:  1. The left ventricular systolic function is normal with a 60-65% estimated ejection fraction.  2. Spectral Doppler shows an impaired relaxation pattern of left ventricular diastolic filling.  3. Slightly elevated RVSP.    Upper GI with small bowel follow-through 5/2/2022:  IMPRESSION:  1. Extensive postoperative changes related to gastric bypass surgery.  2. Irregularity of the distal esophagus wall concerning for  stricture. Clinical correlation recommended. Further evaluation with  endoscopy can be performed as clinically warranted to evaluate for  mucosal abnormality.  3. No signs of bowel obstruction or free air.  4. Additional nonacute findings as detailed.    CAT scan with contrast 5/1/2022:  IMPRESSION:  Surgical changes of gastric bypass with hiatal hernia. There are  dilated loops of small bowel throughout the abdomen pelvis with  decompressed loops of small bowel in the lower pelvis. Findings are  most compatible with small bowel obstruction with suspected  transition point in the left mid to lower abdomen.  Large volume ascites.  Nonobstructing stones in the kidneys bilaterally measuring up to 3 mm  on the right and 2 mm on the left.  Enlarged prostate measures up to 5.6 x 4.8 cm and abuts the posterior  bladder wall. Recommend correlation with PSA values.  The liver appeared normal.    Doppler US 4/16/2022:  IMPRESSION:  Heterogeneity of the liver with  surface nodularity suspicious for  cirrhosis.  Partially imaged small to moderate volume ascites.  Major hepatic vasculature appear patent with normal directional flow.    CAT scan with contrast 4/14/2022:  IMPRESSION:  1. Unusual configuration of the bowel with multiple pathologically  distended loops of small bowel with paucity of bowel loops within the  right abdomen, midline cecum, and majority of small bowel loops  within the midline or left-sided the abdomen. Constellation of  findings is concerning for small bowel obstruction with possible  internal hernia.  2. Massive intra-abdominal and pelvic ascites.  3. Very subtle nodularity the liver which may indicate cirrhotic  morphology.    CAT scan with contrast 2/20/2021 revealed a small amount of free fluid in the abdomen, hepatomegaly.    CAT scan with contrast 11/17/2020 showed a normal liver and no evidence of free fluid.    Echocardiogram 1/6/2020:  CONCLUSIONS:  1. The left ventricular systolic function is normal with a 65-70% estimated ejection fraction.  2. Spectral Doppler shows an impaired relaxation pattern of left ventricular diastolic filling.    EGD 5/8/2024:  Impression  The esophagus appeared normal.  4 cm hiatal hernia  Healthy previous Christopher-en-Y gastric bypass  The jejunum appeared normal.      EGD 3/5/2021:  Impression: - Normal upper third of esophagus, middle third of  esophagus and lower third of esophagus.  - Z-line regular, 35 cm from the incisors.  - 4 cm hiatal hernia.  - Gastric bypass with a pouch 6 cm in length and  intact staple line. Gastrojejunal anastomosis  characterized by an intact appearance and ulceration.  - No specimens collected.    Liver biopsy and peritoneal biopsy 10/3/2022:  FINAL DIAGNOSIS  A. PERITONEAL BIOPSY:  -- FIBROUS TISSUE WITH CHRONIC INFLAMMATION.  B. LIVER BIOPSY:  -- LIVER TISSUE WITH MILD SINUSOIDAL DILATATION AND VAGUE NODULAR PATTERN, SEE  NOTE.  -- NO EVIDENCE OF MALIGNANCY.  Note: The majority of the  "biopsy shows mild sinusoidal dilatation, areas of  vague nodular pattern, and portal triads with mild inflammation and without  interface activity. 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 Although a small fragment of liver tissue shows  bridging fibrosis, it can also represent a subcapsular area with increased  fibrosis. No significant steatosis or hepatocyte injury is identified.  Overall, no definitive feature of cirrhosis is seen. A noncirrhotic portal  hypertension etiology should be included in the differential diagnosis.  Iron stain reveals mild deposition in the hepatocytes. PAS/D stain does not  reveal intracytoplasmic globules. Trichrome highlights focal bridging fibrosis.  Reticulin shows areas of compression of hepatic cords.      Liver Biopsy 6/1/2022:  FINAL DIAGNOSIS  A. LIVER:  -- MILD PORTAL AND PERIPORTAL FIBROSIS WITHOUT EVIDENCE OF BRIDGING FIBROSIS OR  CIRRHOSIS; SEE COMMENT.  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  -- ZONE 3 SINUSOIDAL DILATATION AND CONGESTION.  -- MODERATE IRON DEPOSITION IN AT LEAST 50% OF HEPATOCYTES.  COMMENT: The biopsy shows portal tracts with mild chronic inflammation,  consisting predominantly of lymphocytes with lesser amounts of plasma cells and  eosinophils. A non-specific mild bile ductular reaction is present. Bile  ducts appear intact without evidence of damage or inflammation. There is no  evidence of interface hepatitis or lobular inflammation. Steatosis is minimal  (less than 5%) and there is no evidence of ballooning degeneration or  Kenyetta-Denk bodies to suggest steatohepatitis.  A trichrome stains shows portal and periportal fibrosis with mild zone 3  pericellular fibrosis (fibrosis stage = 1-2 of 4, Eliza-Alexander). Bridging  fibrosis or cirrhosis is not evident. A reticulin stain highlights zone 3  hepatocellular atrophy and a vague \"nodular\" appearance, compatible with " "an  \"NRH\"-like pattern of injury. An iron stain shows moderate iron deposition in  zone 3 regions, with prominent granular deposition in periportal hepatocytes  (approximately 50% overall). A PAS-D stain for alpha-1-antitrypsin globules is  negative.  Overall, the histopathologic features are not specific as to a single  diagnostic entity but are compatible with so-called \"non-cirrhotic portal  hypertension\" (note is made of the clinical presence of portal hypertension  during transjugular liver biopsy). The etiologies for non-cirrhotic portal  hypertension are numerous and require clinical correlation. In addition, given  the presence of increased iron deposition, exclusion of hereditary  hemochromatosis is necessary.    Liver biopsy 1/30/2018:  A. LIVER, CORE BIOPSY:  -- LIMITED HEPATIC PARENCHYMA WITH MILD PORTAL TRACT MIXED CHRONIC  INFLAMMATION, FOCAL MILD INTERFACE ACTIVITY AND FOCUS OF LOBULAR ACTIVITY.  -- PORTAL FIBROSIS, NO EVIDENCE OF BRIDGING FIBROSIS OR CIRRHOSIS.  -- MILD IRON ACCUMULATION IN APPROXIMATELY 50% OF HEPATOCYTES.  -- MILD STEATOSIS, APPROXIMATELY 10%.  -- SEE COMMENT.  COMMENT: Interpretation of the biopsy is limited by small sample size and  limited number of portal tracts (7) available for review. There is mild portal  tract chronic inflammation consisting mostly of lymphocytes with occasional  plasma cells. There is minimal interface activity and rare foci of lobular  inflammation. There is no evidence of cholestasis, ballooning degeneration, or  Kenyetta-Denk bodies. Steatosis is macrovesicular and approximately 10%. There  is focal bile duct proliferation in larger portal tracts, which is  non-specific. There is focal portal tract fibrosis but no evidence of bridging  fibrosis in this limited biopsy (Eliza-Alexander, Stage 1). There is mild iron  accumulation in the majority of hepatocytes, with accentuation in a zone 1  distribution. While non-specific, genetic testing for " hereditary  hemochromatosis should be considered, if clinically indicated. Finally, there  is no evidence of abnormal intracytoplasmic accumulation of  alpha-1-antitrypsin. Iron, trichrome, reticulin and PAS-D stains have been  reviewed. Clinical and serologic correlation is recommended.          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    He obviously has a very complicated past history with multiple surgeries and complications from his bariatric surgery in 2018.    Interestingly, during his surgery in 2018 the surgeon noted that his liver looked grossly cirrhotic.  However, his liver biopsy intraoperatively in January 2018 only showed mild scar tissue.  This is obviously a huge discrepancy.  If he really did only have mild fibrosis, it would be very unlikely that he has progressed to cirrhosis.  On the other hand, if he did have cirrhosis, he obviously could have progressed over time.  Obviously, his main risk factor would be AWAD cirrhosis, but he has undergone the definitive treatment of bariatric surgery with weight loss.    Also confusing is the fact that his different imaging studies have shown a normal liver ranging all the way to cirrhosis.  Also, except for a low albumin, his liver tests and platelets are perfectly normal.    Of note, his ascites fluid is unusual and that it has a very high protein and a very low serum ascites-albumin gradient (SAAG).  The differential for this would usually include malignancy, TB, or fungal infections.  His cytology was negative twice.  His T spot test was negative.  His ascites from 5/2/2022 was checked for TB and fungus.  Those tests were negative.  Echocardiogram and  transjugular liver biopsy with pressures did not show significant evidence of heart failure.  However, the biopsy did show elevated hepatic venous pressure gradient of 16 mmHg.    His TSH was normal.    Given all of these discrepancies, we did repeat a transjugular liver biopsy in June 2022.  As noted above, he did not have cirrhosis or advanced fibrosis.  However, he did have portal hypertension based on pressure measurements.  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    It is possible that he has a multifactorial ascites.  However, there was no evidence of malignancy, infection, or heart failure.  Nodular regenerative hyperplasia can be caused by medications (such as chemotherapy which he is not on), neoplasia, hematologic conditions, infections, autoimmune conditions, or can be idiopathic.  Again, before we assume this diagnosis, the question was whether we should consider diagnostic laparoscopy.  Given the very unusual characteristics of his ascites with the very high protein and low SAAG, I favored a diagnostic laparoscopy.     As noted above, he underwent laparoscopy 10/3/2022 which did show some exudate on the peritoneum, and a soft liver.  The biopsy showed focal stage 3 fibrosis.  I did speak with the surgeon via email on 11/8/2022.  He felt that the exudate was likely some leftover mild inflammation but would not be enough to explain the ascites.  He also did not feel that the patient had cirrhosis grossly to explain ascites.    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  The exact etiology of the nodular regenerative hyperplasia is not clear in his case.  For now, I would try to treat him with diuretics and low-sodium diet.  He seems to be doing reasonably well with his amiloride  10 mg daily daily. Luckily, he has only required 1 paracentesis in the last year.    With his lower extremity edema and the recent paracentesis, I did bring up the idea of increasing his diuretic slightly.  He currently feels that he is doing well and does not wish to change his doses.  Obviously, he will call us if the ascites worsens and we might put him on a very low dose of Lasix and increase the amiloride.    If he eventually fails diuretics, we can consider treatment such as TIPS even in a patient with noncirrhotic portal hypertension and nodular regenerative hyperplasia.  However, I would be very concerned about the potential for encephalopathy from TIPS.  For that reason, I would hold off on it for now.  I also mentioned liver transplant to them.  With his relatively normal LFTs, his MELD score would be very low and he would not get a transplant currently anyway.  In addition, with his advanced age and other medical conditions, I doubt that he would be a good candidate anyway.    He can get AFP and ultrasound every 6 months.    He is immune to hepatitis A.    He is not immune to hepatitis B, despite vaccinations.  There was no evidence of occult hepatitis B to explain this.    At the recommendation of the pathologist based on increased iron on his liver biopsy, I did recommend a genetic hemochromatosis test.  He is a compound heterozygote for hemochromatosis.  However, he only has mild iron in his biopsy.  Also, even though his ferritin is mildly elevated, his iron saturation is low normal.  For these reasons, I would not recommend phlebotomy at this time.  We will continue to check iron panel and ferritin every 6 months.  The latest values again showed no significant elevations that would warrant phlebotomy.      Otherwise, I will have him get labs every 3 months and see him back in about 7 months.  Depending on his overall course when I see him back in about 6 months, we can discuss other options such as  increasing the diuretics or even considering a TIPS.    He can follow-up with Rachel [facility] for his GI needs.    Thank you for allowing me to participate in the care of this patient. Please feel free to contact me with any questions regarding their care.     Sincerely,     Riley Gaspar MD, NIKI, FAASLD, FACG.   Medical Director, Hepatology  [facility]  [facility]  Professor of Medicine  Division of Gastroenterology and Liver Disease  [employer]  [address] [place] [id number]  Phone: [phone]  Fax: [phone].      Cc: Dr. Da Silva  Cc: SHANICE Negrete (GI)      This document was generated with a computerized dictation system.  Because of this, there could be errors in grammar and/or content.           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 04/20/2021    Chronic marginal ulcer    Contusion of left lower leg, initial encounter 05/20/2019    Contusion of left lower leg, initial encounter    Contusion of right lower leg, subsequent encounter 05/22/2019    Contusion of right lower leg, subsequent encounter    Morbid (severe) obesity due to excess calories (Multi) 11/21/2019    Morbid obesity with body mass index of 60.0-69.9 in adult    Neoplasm of unspecified behavior of bone, soft tissue, and skin 08/08/2016    Skin growth    Obesity, unspecified 04/20/2021    Class 1 obesity with serious comorbidity in adult    Other abnormal glucose     Abnormal glucose  level    Partial intestinal obstruction, unspecified as to cause (Multi) 02/01/2021    Small bowel obstruction, partial    Personal history of diseases of the skin and subcutaneous tissue 01/09/2020    History of dermatitis    Personal history of diseases of the skin and subcutaneous tissue 01/09/2020    History of dermatitis    Personal history of diseases of the skin and subcutaneous tissue 10/07/2020    History of contact dermatitis    Personal history of other diseases of the respiratory system 12/03/2018    History of acute sinusitis    Personal history of other diseases of the respiratory system 12/04/2017    History of acute sinusitis    Personal history of other diseases of the respiratory system 11/23/2015    History of sinusitis    Personal history of other mental and behavioral disorders 10/12/2021    History of anxiety    Personal history of other specified conditions 04/06/2021    History of abdominal pain    Personal history of other specified conditions 05/06/2020    History of lump of right breast    Xerosis cutis 01/22/2016    Dry skin dermatitis   [2]   Past Surgical History:  Procedure Laterality Date    CARPAL TUNNEL RELEASE  04/09/2013    Neuroplasty Decompression Median Nerve At Carpal Tunnel    IR VENOGRAM HEPATIC  06/07/2022    IR VENOGRAM HEPATIC 6/7/2022 [facility]    KNEE ARTHROPLASTY Right 04/09/2013    Knee Arthroplasty    MR HEAD ANGIO WO IV CONTRAST  01/06/2020    MR HEAD ANGIO WO IV CONTRAST 1/6/2020 [facility]    MR NECK ANGIO WO IV CONTRAST  01/06/2020    MR NECK ANGIO WO IV CONTRAST 1/6/2020 [facility]    OTHER SURGICAL HISTORY  04/09/2013    Choledochectomy    OTHER SURGICAL HISTORY  02/01/2021    Christopher-en-Y gastric bypass    OTHER SURGICAL HISTORY  02/01/2021    Repair Of Paraesophageal Hiatus Hernia    OTHER SURGICAL HISTORY  12/02/2020    Exploratory laparotomy    OTHER SURGICAL HISTORY  10/07/2022    Exploratory laparoscopy    OTHER SURGICAL HISTORY   05/11/2021    Vascular surgical procedure    US GUIDED ABDOMINAL PARACENTESIS  04/15/2022    US GUIDED ABDOMINAL PARACENTESIS 4/15/2022 [facility]    US GUIDED ABDOMINAL PARACENTESIS  05/02/2022    US GUIDED ABDOMINAL PARACENTESIS 5/2/2022 [facility]    US GUIDED ABDOMINAL PARACENTESIS  06/03/2022    US GUIDED ABDOMINAL PARACENTESIS 6/3/2022 [facility]    US GUIDED ABDOMINAL PARACENTESIS  06/27/2022    US GUIDED ABDOMINAL PARACENTESIS 6/27/2022 [facility]    US GUIDED ABDOMINAL PARACENTESIS  01/11/2023    US GUIDED ABDOMINAL PARACENTESIS 1/11/2023 [facility]    US GUIDED ABDOMINAL PARACENTESIS  02/27/2023    US GUIDED ABDOMINAL PARACENTESIS [facility]    US GUIDED ABDOMINAL PARACENTESIS  08/01/2022    US GUIDED ABDOMINAL PARACENTESIS 8/1/2022 [facility]    US GUIDED ABDOMINAL PARACENTESIS  08/30/2022    US GUIDED ABDOMINAL PARACENTESIS 8/30/2022 [facility]    US GUIDED ABDOMINAL PARACENTESIS  11/15/2022    US GUIDED ABDOMINAL PARACENTESIS 11/15/2022 [facility]    US GUIDED ABDOMINAL PARACENTESIS  04/05/2023    US GUIDED ABDOMINAL PARACENTESIS [facility]    US GUIDED ABDOMINAL PARACENTESIS  07/06/2023    US GUIDED ABDOMINAL PARACENTESIS 7/6/2023 [facility]    US GUIDED ABDOMINAL PARACENTESIS  08/23/2023    US GUIDED ABDOMINAL PARACENTESIS 8/23/2023 [facility]    US GUIDED ABDOMINAL PARACENTESIS  05/23/2023    US GUIDED ABDOMINAL PARACENTESIS [facility]    US GUIDED ABDOMINAL PARACENTESIS  10/03/2023    US GUIDED ABDOMINAL PARACENTESIS 10/3/2023 [facility]    US GUIDED ABDOMINAL PARACENTESIS  11/13/2023    US GUIDED ABDOMINAL PARACENTESIS 11/13/2023 [facility]    US GUIDED ABDOMINAL PARACENTESIS  12/18/2023    US GUIDED ABDOMINAL PARACENTESIS 12/18/2023 Ganga Millard MD [facility]   [3] No family history on file.  [4]   Allergies  Allergen Reactions    Eliquis [Apixaban] Bleeding   [5]   Current Outpatient Medications   Medication Sig Dispense Refill    aMILoride (Midamor) 5 mg tablet  Take 2 tablets (10 mg) by mouth once daily. 60 tablet 11    CALCIUM CARBONATE-VITAMIN D3 ORAL Take by mouth.      furosemide (Lasix) 20 mg tablet Take 1 tablet (20 mg) by mouth once daily. 90 tablet 3    pantoprazole (ProtoNix) 40 mg EC tablet TAKE ONE TABLET BY MOUTH TWO TIMES A DAY, 30 MINUTES BEFORE BREAKFAST AND AT BEDTIME. 180 tablet 1    pediatric multivitamin (Flintstones Multivitamin) tablet,chewable Chew 1 tablet once daily.       No current facility-administered medications for this visit.

## 2025-05-06 NOTE — PATIENT INSTRUCTIONS
Continue to follow a low-sodium diet.    Get labs in 3 months and in 6 months.    Get an ultrasound of the liver now and in 6 months.    See us back in clinic in about 7 months.    Call us if the fluid in the abdomen gets worse.

## 2025-05-21 ENCOUNTER — HOSPITAL ENCOUNTER (OUTPATIENT)
Dept: RADIOLOGY | Facility: HOSPITAL | Age: 75
Discharge: HOME | End: 2025-05-21
Payer: MEDICARE

## 2025-05-21 DIAGNOSIS — R18.8 CIRRHOSIS OF LIVER WITH ASCITES, UNSPECIFIED HEPATIC CIRRHOSIS TYPE (MULTI): ICD-10-CM

## 2025-05-21 DIAGNOSIS — K76.6 PORTAL HYPERTENSION (MULTI): ICD-10-CM

## 2025-05-21 DIAGNOSIS — K74.60 CIRRHOSIS OF LIVER WITH ASCITES, UNSPECIFIED HEPATIC CIRRHOSIS TYPE (MULTI): ICD-10-CM

## 2025-05-21 PROCEDURE — 93975 VASCULAR STUDY: CPT

## 2025-06-25 ENCOUNTER — APPOINTMENT (OUTPATIENT)
Dept: PRIMARY CARE | Facility: CLINIC | Age: 75
End: 2025-06-25
Payer: MEDICARE

## 2025-06-25 VITALS
WEIGHT: 193 LBS | SYSTOLIC BLOOD PRESSURE: 122 MMHG | DIASTOLIC BLOOD PRESSURE: 70 MMHG | BODY MASS INDEX: 30.23 KG/M2 | OXYGEN SATURATION: 98 % | TEMPERATURE: 97.2 F | HEART RATE: 51 BPM

## 2025-06-25 DIAGNOSIS — E83.110 HEREDITARY HEMOCHROMATOSIS: ICD-10-CM

## 2025-06-25 DIAGNOSIS — R18.8 OTHER ASCITES: ICD-10-CM

## 2025-06-25 DIAGNOSIS — Z79.899 HIGH RISK MEDICATION USE: Primary | ICD-10-CM

## 2025-06-25 DIAGNOSIS — I10 HYPERTENSION, UNSPECIFIED TYPE: ICD-10-CM

## 2025-06-25 DIAGNOSIS — E55.9 VITAMIN D DEFICIENCY, UNSPECIFIED: ICD-10-CM

## 2025-06-25 DIAGNOSIS — E53.8 VITAMIN B12 DEFICIENCY: ICD-10-CM

## 2025-06-25 DIAGNOSIS — R73.09 ABNORMAL BLOOD SUGAR: ICD-10-CM

## 2025-06-25 DIAGNOSIS — Z12.5 SCREENING FOR PROSTATE CANCER: ICD-10-CM

## 2025-06-25 DIAGNOSIS — R53.83 OTHER FATIGUE: ICD-10-CM

## 2025-06-25 DIAGNOSIS — K21.00 GASTROESOPHAGEAL REFLUX DISEASE WITH ESOPHAGITIS WITHOUT HEMORRHAGE: ICD-10-CM

## 2025-06-25 DIAGNOSIS — K74.60 HEPATIC CIRRHOSIS, UNSPECIFIED HEPATIC CIRRHOSIS TYPE, UNSPECIFIED WHETHER ASCITES PRESENT (MULTI): ICD-10-CM

## 2025-06-25 DIAGNOSIS — E78.00 HYPERCHOLESTEREMIA: ICD-10-CM

## 2025-06-25 DIAGNOSIS — I10 BENIGN ESSENTIAL HYPERTENSION: ICD-10-CM

## 2025-06-25 PROCEDURE — 1159F MED LIST DOCD IN RCRD: CPT | Performed by: INTERNAL MEDICINE

## 2025-06-25 PROCEDURE — G2211 COMPLEX E/M VISIT ADD ON: HCPCS | Performed by: INTERNAL MEDICINE

## 2025-06-25 PROCEDURE — 1160F RVW MEDS BY RX/DR IN RCRD: CPT | Performed by: INTERNAL MEDICINE

## 2025-06-25 PROCEDURE — 1036F TOBACCO NON-USER: CPT | Performed by: INTERNAL MEDICINE

## 2025-06-25 PROCEDURE — 3078F DIAST BP <80 MM HG: CPT | Performed by: INTERNAL MEDICINE

## 2025-06-25 PROCEDURE — 3074F SYST BP LT 130 MM HG: CPT | Performed by: INTERNAL MEDICINE

## 2025-06-25 PROCEDURE — 99214 OFFICE O/P EST MOD 30 MIN: CPT | Performed by: INTERNAL MEDICINE

## 2025-06-25 ASSESSMENT — ENCOUNTER SYMPTOMS
SINUS PAIN: 0
SORE THROAT: 0
WHEEZING: 0
ARTHRALGIAS: 0
DIZZINESS: 0
UNEXPECTED WEIGHT CHANGE: 0
DIARRHEA: 0
BLOOD IN STOOL: 0
DIFFICULTY URINATING: 0
FATIGUE: 0
BRUISES/BLEEDS EASILY: 0
HYPERTENSION: 1
HEADACHES: 0
FEVER: 0
PALPITATIONS: 0
ABDOMINAL PAIN: 0
COUGH: 0

## 2025-06-25 NOTE — PROGRESS NOTES
Subjective   Patient ID: William Philip is a 74 y.o. male who presents for Hypertension and GERD.     - Recent blood work reviewed  - Hepatic cirrhosis and ascites controlled no paracentesis done recently compensated follow-up Dr. Duggan as recommended continue low-salt diet counseled about fat-free diet patient diagnosed with high iron content requires only monitoring for hemochromatosis iron overload check every 6 months follow-up results closely  --- gynecomastia switched to amiloride 5 mg daily doing much better.  - Liver cirrhosis stage III portal hypertension ascites going for paracentesis as needed.  Compensated  - Bilateral hand tremors controlled  --Fatigue and tiredness underlying sleep apnea  Continue CPAP, compensated  --Chronic bradycardia no change asymptomatic continue monitoring conservatively  -Hypertension controlled  --Urinary incontinence improved since surgery  -History of tachyarrhythmias no recurrence no chest pain  -History of nephrolithiasis no recurrence history of DVT lower extremity resolved, underlying IVC filter no recurrence  Follow-up 3 months Medicare physical needs to complete blood work before next appointment      Hypertension  Pertinent negatives include no chest pain, headaches or palpitations.   GERD  He reports no abdominal pain, no chest pain, no coughing, no sore throat or no wheezing. Pertinent negatives include no fatigue.          Review of Systems   Constitutional:  Negative for fatigue, fever and unexpected weight change.   HENT:  Negative for congestion, ear discharge, ear pain, mouth sores, sinus pain and sore throat.    Eyes:  Negative for visual disturbance.   Respiratory:  Negative for cough and wheezing.    Cardiovascular:  Negative for chest pain, palpitations and leg swelling.   Gastrointestinal:  Negative for abdominal pain, blood in stool and diarrhea.   Genitourinary:  Negative for difficulty urinating.   Musculoskeletal:  Negative for arthralgias.   Skin:   Negative for rash.   Neurological:  Negative for dizziness and headaches.   Hematological:  Does not bruise/bleed easily.   Psychiatric/Behavioral:  Negative for behavioral problems.    All other systems reviewed and are negative.      Objective   Lab Results   Component Value Date    HGBA1C 4.6 05/14/2021      /70   Pulse 51   Temp 36.2 °C (97.2 °F)   Wt 87.5 kg (193 lb)   SpO2 98%   BMI 30.23 kg/m²   Lab Results   Component Value Date    WBC 5.3 09/25/2024    HGB 14.5 09/25/2024    HCT 47.2 09/25/2024     09/25/2024    CHOL 119 09/25/2024    TRIG 83 09/25/2024    HDL 49.7 09/25/2024    ALT 10 09/25/2024    ALT 10 09/25/2024    AST 17 09/25/2024    AST 17 09/25/2024     09/25/2024    K 4.5 09/25/2024     09/25/2024    CREATININE 0.91 09/25/2024    BUN 14 09/25/2024    CO2 28 09/25/2024    TSH 3.42 09/25/2024    PSA 1.20 05/02/2022    INR 1.1 09/25/2024    HGBA1C 4.6 05/14/2021     par   Physical Exam  Vitals and nursing note reviewed.   Constitutional:       Appearance: Normal appearance.   HENT:      Head: Normocephalic.      Nose: Nose normal.   Eyes:      Conjunctiva/sclera: Conjunctivae normal.      Pupils: Pupils are equal, round, and reactive to light.   Cardiovascular:      Rate and Rhythm: Regular rhythm.   Pulmonary:      Effort: Pulmonary effort is normal.      Breath sounds: Normal breath sounds.   Abdominal:      General: Abdomen is flat.      Palpations: Abdomen is soft.   Musculoskeletal:      Cervical back: Neck supple.   Skin:     General: Skin is warm.   Neurological:      General: No focal deficit present.      Mental Status: He is oriented to person, place, and time.   Psychiatric:         Mood and Affect: Mood normal.         Assessment/Plan   William was seen today for hypertension and gerd.  Diagnoses and all orders for this visit:  High risk medication use (Primary)  -     CBC and Auto Differential; Future  -     CBC and Auto Differential  Hypertension, unspecified  type  -     Comprehensive Metabolic Panel; Future  -     Comprehensive Metabolic Panel  Abnormal blood sugar  -     Hemoglobin A1C; Future  -     Hemoglobin A1C  Hypercholesteremia  -     Lipid Panel; Future  -     Lipid Panel  Screening for prostate cancer  -     Prostate Specific Antigen, Screen; Future  -     Prostate Specific Antigen, Screen  Other fatigue  -     TSH with reflex to Free T4 if abnormal; Future  -     TSH with reflex to Free T4 if abnormal  Vitamin B12 deficiency  -     Vitamin B12; Future  -     Vitamin B12  Vitamin D deficiency, unspecified  -     Vitamin D 25-Hydroxy,Total (for eval of Vitamin D levels); Future  -     Vitamin D 25-Hydroxy,Total (for eval of Vitamin D levels)  Benign essential hypertension  Gastroesophageal reflux disease with esophagitis without hemorrhage  Other ascites  Hepatic cirrhosis, unspecified hepatic cirrhosis type, unspecified whether ascites present (Multi)  Hereditary hemochromatosis  Other orders  -     Follow Up In Primary Care - Established  -     Follow Up In Primary Care - Medicare Annual; Future    - Recent blood work reviewed  - Hepatic cirrhosis and ascites controlled no paracentesis done recently compensated follow-up Dr. Duggan as recommended continue low-salt diet counseled about fat-free diet patient diagnosed with high iron content requires only monitoring for hemochromatosis iron overload check every 6 months follow-up results closely  --- gynecomastia switched to amiloride 5 mg daily doing much better.  - Liver cirrhosis stage III portal hypertension ascites going for paracentesis as needed.  Compensated  - Bilateral hand tremors controlled  --Fatigue and tiredness underlying sleep apnea  Continue CPAP, compensated  --Chronic bradycardia no change asymptomatic continue monitoring conservatively  -Hypertension controlled  --Urinary incontinence improved since surgery  -History of tachyarrhythmias no recurrence no chest pain  -History of  nephrolithiasis no recurrence history of DVT lower extremity resolved, underlying IVC filter no recurrence  Follow-up 3 months Medicare physical needs to complete blood work before next appointment

## 2025-07-25 DIAGNOSIS — K76.6 PORTAL HYPERTENSION (MULTI): ICD-10-CM

## 2025-07-25 DIAGNOSIS — K74.60 CIRRHOSIS OF LIVER WITH ASCITES, UNSPECIFIED HEPATIC CIRRHOSIS TYPE (MULTI): ICD-10-CM

## 2025-07-25 DIAGNOSIS — R18.8 CIRRHOSIS OF LIVER WITH ASCITES, UNSPECIFIED HEPATIC CIRRHOSIS TYPE (MULTI): ICD-10-CM

## 2025-08-06 ENCOUNTER — TELEPHONE (OUTPATIENT)
Dept: GASTROENTEROLOGY | Facility: HOSPITAL | Age: 75
End: 2025-08-06
Payer: MEDICARE

## 2025-08-06 DIAGNOSIS — K76.6 PORTAL HYPERTENSION (MULTI): ICD-10-CM

## 2025-08-06 DIAGNOSIS — K74.60 CIRRHOSIS OF LIVER WITH ASCITES, UNSPECIFIED HEPATIC CIRRHOSIS TYPE (MULTI): ICD-10-CM

## 2025-08-06 DIAGNOSIS — R18.8 CIRRHOSIS OF LIVER WITH ASCITES, UNSPECIFIED HEPATIC CIRRHOSIS TYPE (MULTI): ICD-10-CM

## 2025-08-06 RX ORDER — AMILORIDE HYDROCHLORIDE 5 MG/1
10 TABLET ORAL DAILY
Qty: 60 TABLET | Refills: 11 | Status: SHIPPED | OUTPATIENT
Start: 2025-08-06

## 2025-08-07 LAB
ALBUMIN SERPL-MCNC: 4.3 G/DL (ref 3.6–5.1)
ALBUMIN/GLOB SERPL: 1.3 (CALC) (ref 1–2.5)
ALP SERPL-CCNC: 69 U/L (ref 35–144)
ALT SERPL-CCNC: 9 U/L (ref 9–46)
ANION GAP SERPL CALCULATED.4IONS-SCNC: 8 MMOL/L (CALC) (ref 7–17)
AST SERPL-CCNC: 19 U/L (ref 10–35)
BASOPHILS # BLD AUTO: 61 CELLS/UL (ref 0–200)
BASOPHILS NFR BLD AUTO: 1.2 %
BILIRUB DIRECT SERPL-MCNC: 0.2 MG/DL
BILIRUB INDIRECT SERPL-MCNC: 0.4 MG/DL (CALC) (ref 0.2–1.2)
BILIRUB SERPL-MCNC: 0.6 MG/DL (ref 0.2–1.2)
BUN SERPL-MCNC: 16 MG/DL (ref 7–25)
BUN/CREAT SERPL: NORMAL (CALC) (ref 6–22)
CALCIUM SERPL-MCNC: 9.3 MG/DL (ref 8.6–10.3)
CHLORIDE SERPL-SCNC: 101 MMOL/L (ref 98–110)
CO2 SERPL-SCNC: 28 MMOL/L (ref 20–32)
CREAT SERPL-MCNC: 1.05 MG/DL (ref 0.7–1.28)
EGFRCR SERPLBLD CKD-EPI 2021: 74 ML/MIN/1.73M2
EOSINOPHIL # BLD AUTO: 82 CELLS/UL (ref 15–500)
EOSINOPHIL NFR BLD AUTO: 1.6 %
ERYTHROCYTE [DISTWIDTH] IN BLOOD BY AUTOMATED COUNT: 13.1 % (ref 11–15)
GLOBULIN SER CALC-MCNC: 3.3 G/DL (CALC) (ref 1.9–3.7)
GLUCOSE SERPL-MCNC: 89 MG/DL (ref 65–139)
HCT VFR BLD AUTO: 46.5 % (ref 38.5–50)
HGB BLD-MCNC: 15 G/DL (ref 13.2–17.1)
INR PPP: 1
LYMPHOCYTES # BLD AUTO: 1433 CELLS/UL (ref 850–3900)
LYMPHOCYTES NFR BLD AUTO: 28.1 %
MCH RBC QN AUTO: 31.3 PG (ref 27–33)
MCHC RBC AUTO-ENTMCNC: 32.3 G/DL (ref 32–36)
MCV RBC AUTO: 96.9 FL (ref 80–100)
MONOCYTES # BLD AUTO: 296 CELLS/UL (ref 200–950)
MONOCYTES NFR BLD AUTO: 5.8 %
NEUTROPHILS # BLD AUTO: 3228 CELLS/UL (ref 1500–7800)
NEUTROPHILS NFR BLD AUTO: 63.3 %
PLATELET # BLD AUTO: 213 THOUSAND/UL (ref 140–400)
PMV BLD REES-ECKER: 10.3 FL (ref 7.5–12.5)
POTASSIUM SERPL-SCNC: 4.7 MMOL/L (ref 3.5–5.3)
PROT SERPL-MCNC: 7.6 G/DL (ref 6.1–8.1)
PROTHROMBIN TIME: 11 SEC (ref 9–11.5)
RBC # BLD AUTO: 4.8 MILLION/UL (ref 4.2–5.8)
SODIUM SERPL-SCNC: 137 MMOL/L (ref 135–146)
WBC # BLD AUTO: 5.1 THOUSAND/UL (ref 3.8–10.8)

## 2025-09-02 ENCOUNTER — OFFICE VISIT (OUTPATIENT)
Dept: URGENT CARE | Age: 75
End: 2025-09-02
Payer: MEDICARE

## 2025-09-02 ENCOUNTER — TELEPHONE (OUTPATIENT)
Dept: PRIMARY CARE | Facility: CLINIC | Age: 75
End: 2025-09-02
Payer: MEDICARE

## 2025-09-02 VITALS
RESPIRATION RATE: 18 BRPM | HEIGHT: 67 IN | DIASTOLIC BLOOD PRESSURE: 65 MMHG | BODY MASS INDEX: 30.29 KG/M2 | TEMPERATURE: 97.6 F | SYSTOLIC BLOOD PRESSURE: 151 MMHG | HEART RATE: 50 BPM | OXYGEN SATURATION: 100 % | WEIGHT: 193 LBS

## 2025-09-02 DIAGNOSIS — L23.7 POISON IVY DERMATITIS: Primary | ICD-10-CM

## 2025-09-02 PROCEDURE — 1159F MED LIST DOCD IN RCRD: CPT

## 2025-09-02 PROCEDURE — 3078F DIAST BP <80 MM HG: CPT

## 2025-09-02 PROCEDURE — 3077F SYST BP >= 140 MM HG: CPT

## 2025-09-02 PROCEDURE — 1036F TOBACCO NON-USER: CPT

## 2025-09-02 PROCEDURE — 1160F RVW MEDS BY RX/DR IN RCRD: CPT

## 2025-09-02 PROCEDURE — 99203 OFFICE O/P NEW LOW 30 MIN: CPT

## 2025-09-02 RX ORDER — TRIAMCINOLONE ACETONIDE 1 MG/G
CREAM TOPICAL
Qty: 15 G | Refills: 0 | Status: SHIPPED | OUTPATIENT
Start: 2025-09-02 | End: 2025-11-01

## 2025-09-02 ASSESSMENT — ENCOUNTER SYMPTOMS
NUMBNESS: 0
JOINT SWELLING: 0
CHILLS: 0
COLOR CHANGE: 0
BACK PAIN: 0
FEVER: 0
DIZZINESS: 0
ARTHRALGIAS: 0
COUGH: 0
WEAKNESS: 0
FATIGUE: 0
SHORTNESS OF BREATH: 0

## 2025-09-02 ASSESSMENT — PATIENT HEALTH QUESTIONNAIRE - PHQ9
SUM OF ALL RESPONSES TO PHQ9 QUESTIONS 1 & 2: 0
1. LITTLE INTEREST OR PLEASURE IN DOING THINGS: NOT AT ALL
2. FEELING DOWN, DEPRESSED OR HOPELESS: NOT AT ALL

## 2025-09-29 ENCOUNTER — APPOINTMENT (OUTPATIENT)
Dept: PRIMARY CARE | Facility: CLINIC | Age: 75
End: 2025-09-29
Payer: MEDICARE

## 2025-11-10 ENCOUNTER — APPOINTMENT (OUTPATIENT)
Dept: SLEEP MEDICINE | Facility: CLINIC | Age: 75
End: 2025-11-10
Payer: MEDICARE